# Patient Record
Sex: MALE | Race: WHITE | NOT HISPANIC OR LATINO | URBAN - METROPOLITAN AREA
[De-identification: names, ages, dates, MRNs, and addresses within clinical notes are randomized per-mention and may not be internally consistent; named-entity substitution may affect disease eponyms.]

---

## 2024-05-06 ENCOUNTER — OFFICE VISIT (OUTPATIENT)
Dept: PRIMARY CARE | Facility: CLINIC | Age: 54
End: 2024-05-06
Payer: COMMERCIAL

## 2024-05-06 VITALS — SYSTOLIC BLOOD PRESSURE: 122 MMHG | WEIGHT: 315 LBS | HEART RATE: 110 BPM | DIASTOLIC BLOOD PRESSURE: 76 MMHG

## 2024-05-06 DIAGNOSIS — R93.2 ABNORMAL LIVER CT: ICD-10-CM

## 2024-05-06 DIAGNOSIS — Z23 NEED FOR SHINGLES VACCINE: ICD-10-CM

## 2024-05-06 DIAGNOSIS — E66.9 TYPE 2 DIABETES MELLITUS WITH OBESITY (MULTI): Primary | ICD-10-CM

## 2024-05-06 DIAGNOSIS — F51.01 PRIMARY INSOMNIA: ICD-10-CM

## 2024-05-06 DIAGNOSIS — I10 ESSENTIAL HYPERTENSION: ICD-10-CM

## 2024-05-06 DIAGNOSIS — E78.2 MIXED HYPERLIPIDEMIA: ICD-10-CM

## 2024-05-06 DIAGNOSIS — Z12.11 COLON CANCER SCREENING: ICD-10-CM

## 2024-05-06 DIAGNOSIS — I25.10 CORONARY ARTERY CALCIFICATION SEEN ON CT SCAN: ICD-10-CM

## 2024-05-06 DIAGNOSIS — E11.69 TYPE 2 DIABETES MELLITUS WITH OBESITY (MULTI): Primary | ICD-10-CM

## 2024-05-06 PROBLEM — G47.33 SLEEP APNEA, OBSTRUCTIVE: Status: ACTIVE | Noted: 2024-05-06

## 2024-05-06 PROBLEM — E55.9 VITAMIN D DEFICIENCY: Status: ACTIVE | Noted: 2021-03-08

## 2024-05-06 PROBLEM — B35.1 ONYCHOMYCOSIS: Status: ACTIVE | Noted: 2023-01-04

## 2024-05-06 PROBLEM — E66.813 OBESITY, CLASS III, BMI 40-49.9 (MORBID OBESITY): Status: ACTIVE | Noted: 2018-04-17

## 2024-05-06 PROBLEM — R00.2 PALPITATIONS: Status: ACTIVE | Noted: 2021-04-07

## 2024-05-06 PROBLEM — E66.01 OBESITY, CLASS III, BMI 40-49.9 (MORBID OBESITY) (MULTI): Status: ACTIVE | Noted: 2018-04-17

## 2024-05-06 PROCEDURE — 90750 HZV VACC RECOMBINANT IM: CPT | Performed by: FAMILY MEDICINE

## 2024-05-06 PROCEDURE — 4010F ACE/ARB THERAPY RXD/TAKEN: CPT | Performed by: FAMILY MEDICINE

## 2024-05-06 PROCEDURE — 90471 IMMUNIZATION ADMIN: CPT | Performed by: FAMILY MEDICINE

## 2024-05-06 PROCEDURE — 99204 OFFICE O/P NEW MOD 45 MIN: CPT | Performed by: FAMILY MEDICINE

## 2024-05-06 PROCEDURE — 1036F TOBACCO NON-USER: CPT | Performed by: FAMILY MEDICINE

## 2024-05-06 PROCEDURE — 3078F DIAST BP <80 MM HG: CPT | Performed by: FAMILY MEDICINE

## 2024-05-06 PROCEDURE — 3074F SYST BP LT 130 MM HG: CPT | Performed by: FAMILY MEDICINE

## 2024-05-06 RX ORDER — METFORMIN HYDROCHLORIDE 500 MG/1
1000 TABLET, EXTENDED RELEASE ORAL 2 TIMES DAILY
COMMUNITY
End: 2024-05-09 | Stop reason: SDUPTHER

## 2024-05-06 RX ORDER — ATORVASTATIN CALCIUM 20 MG/1
20 TABLET, FILM COATED ORAL NIGHTLY
COMMUNITY
End: 2024-05-09 | Stop reason: SDUPTHER

## 2024-05-06 RX ORDER — TRAZODONE HYDROCHLORIDE 100 MG/1
100 TABLET ORAL NIGHTLY
COMMUNITY
End: 2024-05-06 | Stop reason: SDUPTHER

## 2024-05-06 RX ORDER — TRAZODONE HYDROCHLORIDE 100 MG/1
100 TABLET ORAL NIGHTLY
Qty: 90 TABLET | Refills: 1 | Status: SHIPPED | OUTPATIENT
Start: 2024-05-06 | End: 2024-11-02

## 2024-05-06 RX ORDER — AMLODIPINE BESYLATE 10 MG/1
10 TABLET ORAL DAILY
COMMUNITY
End: 2024-05-06 | Stop reason: SDUPTHER

## 2024-05-06 RX ORDER — LISINOPRIL 20 MG/1
20 TABLET ORAL DAILY
COMMUNITY
End: 2024-05-06 | Stop reason: SDUPTHER

## 2024-05-06 RX ORDER — ACETAMINOPHEN 500 MG
2000 TABLET ORAL DAILY
COMMUNITY

## 2024-05-06 RX ORDER — DOCUSATE SODIUM 100 MG/1
100 CAPSULE, LIQUID FILLED ORAL NIGHTLY
COMMUNITY

## 2024-05-06 RX ORDER — LISINOPRIL 20 MG/1
20 TABLET ORAL DAILY
Qty: 90 TABLET | Refills: 1 | Status: SHIPPED | OUTPATIENT
Start: 2024-05-06 | End: 2024-11-02

## 2024-05-06 RX ORDER — AMLODIPINE BESYLATE 10 MG/1
10 TABLET ORAL DAILY
Qty: 90 TABLET | Refills: 1 | Status: SHIPPED | OUTPATIENT
Start: 2024-05-06 | End: 2024-11-02

## 2024-05-06 RX ORDER — GLIMEPIRIDE 4 MG/1
4 TABLET ORAL
COMMUNITY
End: 2024-05-09 | Stop reason: SDUPTHER

## 2024-05-06 ASSESSMENT — PATIENT HEALTH QUESTIONNAIRE - PHQ9
2. FEELING DOWN, DEPRESSED OR HOPELESS: NOT AT ALL
1. LITTLE INTEREST OR PLEASURE IN DOING THINGS: NOT AT ALL
SUM OF ALL RESPONSES TO PHQ9 QUESTIONS 1 AND 2: 0

## 2024-05-06 NOTE — ASSESSMENT & PLAN NOTE
We talked at length about the importance of weight loss which can be helped with diminished or elimination of alcohol use    We talked about the use of GLP-1 medication such as Mounjaro, and if you change your mind and accept a trial, please notify me so that I can prescribe that medication for you

## 2024-05-06 NOTE — ASSESSMENT & PLAN NOTE
We reviewed your CT cardiac score from 2021 which revealed a lobulated hepatic contour which can be indicative of liver disease so we need to make sure that the alcohol that you are taking gets diminished dramatically to the point of elimination and we can continue to monitor normal your LFTs, but we may have to consider imaging going forward as well

## 2024-05-06 NOTE — ASSESSMENT & PLAN NOTE
Continue lifestyle modifications with a goal of weight loss to try to maintain proper hemoglobin A1c, i.e. glucose control    Please obtain A1c at your earliest convenience so I can adjust medication accordingly

## 2024-05-06 NOTE — ASSESSMENT & PLAN NOTE
Please obtain fasting blood work to ensure proper dosing of your Lipitor  We will notify of test results once available

## 2024-05-06 NOTE — PROGRESS NOTES
Subjective   Patient ID: Axel Mohan is a 54 y.o. male who presents for Hyperlipidemia, Hypertension, Diabetes, and Insomnia.    Past Medical, Surgical, and Family History reviewed and updated in chart.    Reviewed all medications by prescribing practitioner or clinical pharmacist (such as prescriptions, OTCs, herbal therapies and supplements) and documented in the medical record.    HPI  Axel is a new patient, here to establish based on the recommendation of Katlyn Thornton NP, with a medical history of hypertension, hyperlipidemia, diabetes, vitamin D deficiency, and insomnia. He is generally normotensive outside of clinical settings. His intake blood pressure was initially elevated but normalized to 122/76 after a few minutes.     Cardiovascular Health:  Axel denies experiencing significant chest pain or shortness of breath, although he occasionally feels twinges in his left chest area. He expresses concern about developing atrial fibrillation, a condition his father had, but currently denies any palpitations.    Liver/lipid health:  In 2021, Axel's previous primary care provider, Katlyn Thornton NP, ordered a CT calcium scan. The results revealed a score of 24.8, fatty liver, and a 2.2 cm low-density lesion centrally located within the liver, likely a cyst. The radiologist noted that the cyst was suboptimally evaluated without IV contrast. There was also a concern about a mildly lobulated hepatic contour, which could be indicative of cirrhosis.    Diabetes and Health Management:  Axel admits to struggling with weight loss due to his fondness for beer. Despite previous advice to reduce his alcohol intake, he has not done so. He and Katlyn discussed the use of Mounjaro in the past, but he opted for lifestyle changes instead.    Digestive Health:  Axel has not undergone a colonoscopy since he was 34. Katlyn sent a Cologuard home test, but Axel has not returned it. He indicates a reluctant interest in  pursuing a colonoscopy instead.    Medication Management:  Axel is not fasting today but needs refills for his medications. He requests that they be sent to the local [facility]. He intends to have fasting blood work done in the near future.    Review of Systems  All pertinent positive symptoms are included in the history of present illness.    All other systems have been reviewed and are negative and noncontributory to this patient's current ailments.    History reviewed. No pertinent past medical history.  History reviewed. No pertinent surgical history.  Social History     Tobacco Use    Smoking status: Never    Smokeless tobacco: Never     Family History   Problem Relation Name Age of Onset    Atrial fibrillation Father       Immunization History   Administered Date(s) Administered    Pneumococcal conjugate vaccine, 20-valent (PREVNAR 20) 06/29/2022    Pneumococcal polysaccharide vaccine, 23-valent, age 2 years and older (PNEUMOVAX 23) 01/08/2021    Td vaccine, age 7 years and older (TENIVAC) 04/20/2017    Zoster vaccine, recombinant, adult (SHINGRIX) 05/06/2024     Current Outpatient Medications   Medication Instructions    amLODIPine (NORVASC) 10 mg, oral, Daily    atorvastatin (LIPITOR) 20 mg, oral, Nightly    cholecalciferol (VITAMIN D-3) 2,000 Units, oral, Daily    docusate sodium (COLACE) 100 mg, oral, Nightly    glimepiride (AMARYL) 4 mg, oral, Daily with breakfast    lisinopril 20 mg, oral, Daily    metFORMIN XR (GLUCOPHAGE-XR) 1,000 mg, oral, 2 times daily    traZODone (DESYREL) 100 mg, oral, Nightly     No Known Allergies    Objective   Vitals:    05/06/24 1310 05/06/24 1353   BP: 138/80 122/76   Pulse: 110    Weight: (!) 161 kg (356 lb)      There is no height or weight on file to calculate BMI.    BP Readings from Last 3 Encounters:   05/06/24 122/76      Wt Readings from Last 3 Encounters:   05/06/24 (!) 161 kg (356 lb)     Physical Exam  CONSTITUTIONAL - well nourished, well developed, morbidly obese  white male, looks like stated age, in no acute distress, not ill-appearing, and not tired appearing  SKIN - normal skin color and pigmentation, normal skin turgor without rash, lesions, or nodules visualized  HEAD - no trauma, normocephalic  EYES - pupils are equal and reactive to light, extraocular muscles are intact, and normal external exam  CHEST - clear to auscultation, no wheezing, no crackles and no rales, good effort  CARDIAC - regular rate and regular rhythm, no skipped beats, no murmur  EXTREMITIES - no obvious or evident edema, no obvious or evident deformities  NEUROLOGICAL - normal gait, normal balance, normal motor, no ataxia, alert, oriented and no focal signs  PSYCHIATRIC - alert, pleasant and cordial, age-appropriate    Assessment/Plan   Problem List Items Addressed This Visit       Essential hypertension     Stable on repeat, no changes to medication recommended         Relevant Medications    amLODIPine (Norvasc) 10 mg tablet    lisinopril 20 mg tablet    Other Relevant Orders    Comprehensive Metabolic Panel    Mixed hyperlipidemia     Please obtain fasting blood work to ensure proper dosing of your Lipitor  We will notify of test results once available         Relevant Orders    Lipid Panel    Type 2 diabetes mellitus with obesity (Multi) - Primary     Continue lifestyle modifications with a goal of weight loss to try to maintain proper hemoglobin A1c, i.e. glucose control    Please obtain A1c at your earliest convenience so I can adjust medication accordingly         Relevant Orders    Hemoglobin A1C    Abnormal liver CT     We reviewed your CT cardiac score from 2021 which revealed a lobulated hepatic contour which can be indicative of liver disease so we need to make sure that the alcohol that you are taking gets diminished dramatically to the point of elimination and we can continue to monitor normal your LFTs, but we may have to consider imaging going forward as well         Coronary artery  calcification seen on CT scan    Relevant Medications    amLODIPine (Norvasc) 10 mg tablet    Other Relevant Orders    Lipid Panel     Other Visit Diagnoses       Colon cancer screening        Overdue for colon cancer screening  Colonoscopy requisition sent, please set up with GI ASAP    Relevant Orders    Colonoscopy Screening; Average Risk Patient    Need for shingles vaccine        RTC 6 months for physical exam at which time we will provide Shingrix No. 2    Relevant Orders    Zoster vaccine, recombinant, adult (SHINGRIX) (Completed)    Primary insomnia        Relevant Medications    traZODone (Desyrel) 100 mg tablet

## 2024-05-07 DIAGNOSIS — Z12.11 COLON CANCER SCREENING: ICD-10-CM

## 2024-05-08 ENCOUNTER — APPOINTMENT (OUTPATIENT)
Dept: PRIMARY CARE | Facility: CLINIC | Age: 54
End: 2024-05-08
Payer: COMMERCIAL

## 2024-05-08 ENCOUNTER — LAB (OUTPATIENT)
Dept: LAB | Facility: LAB | Age: 54
End: 2024-05-08
Payer: COMMERCIAL

## 2024-05-08 DIAGNOSIS — E78.2 MIXED HYPERLIPIDEMIA: ICD-10-CM

## 2024-05-08 DIAGNOSIS — E11.69 TYPE 2 DIABETES MELLITUS WITH OBESITY (MULTI): ICD-10-CM

## 2024-05-08 DIAGNOSIS — I10 ESSENTIAL HYPERTENSION: ICD-10-CM

## 2024-05-08 DIAGNOSIS — I25.10 CORONARY ARTERY CALCIFICATION SEEN ON CT SCAN: ICD-10-CM

## 2024-05-08 DIAGNOSIS — E66.9 TYPE 2 DIABETES MELLITUS WITH OBESITY (MULTI): ICD-10-CM

## 2024-05-08 LAB
ALBUMIN SERPL BCP-MCNC: 4.5 G/DL (ref 3.4–5)
ALP SERPL-CCNC: 56 U/L (ref 33–120)
ALT SERPL W P-5'-P-CCNC: 37 U/L (ref 10–52)
ANION GAP SERPL CALC-SCNC: 12 MMOL/L (ref 10–20)
AST SERPL W P-5'-P-CCNC: 24 U/L (ref 9–39)
BILIRUB SERPL-MCNC: 0.4 MG/DL (ref 0–1.2)
BUN SERPL-MCNC: 15 MG/DL (ref 6–23)
CALCIUM SERPL-MCNC: 9.7 MG/DL (ref 8.6–10.3)
CHLORIDE SERPL-SCNC: 100 MMOL/L (ref 98–107)
CHOLEST SERPL-MCNC: 138 MG/DL (ref 0–199)
CHOLESTEROL/HDL RATIO: 1.9
CO2 SERPL-SCNC: 24 MMOL/L (ref 21–32)
CREAT SERPL-MCNC: 0.85 MG/DL (ref 0.5–1.3)
EGFRCR SERPLBLD CKD-EPI 2021: >90 ML/MIN/1.73M*2
EST. AVERAGE GLUCOSE BLD GHB EST-MCNC: 154 MG/DL
GLUCOSE SERPL-MCNC: 153 MG/DL (ref 74–99)
HBA1C MFR BLD: 7 %
HDLC SERPL-MCNC: 72.2 MG/DL
LDLC SERPL CALC-MCNC: 52 MG/DL
NON HDL CHOLESTEROL: 66 MG/DL (ref 0–149)
POTASSIUM SERPL-SCNC: 4.4 MMOL/L (ref 3.5–5.3)
PROT SERPL-MCNC: 7.5 G/DL (ref 6.4–8.2)
SODIUM SERPL-SCNC: 132 MMOL/L (ref 136–145)
TRIGL SERPL-MCNC: 71 MG/DL (ref 0–149)
VLDL: 14 MG/DL (ref 0–40)

## 2024-05-08 PROCEDURE — 80053 COMPREHEN METABOLIC PANEL: CPT

## 2024-05-08 PROCEDURE — 83036 HEMOGLOBIN GLYCOSYLATED A1C: CPT

## 2024-05-08 PROCEDURE — 36415 COLL VENOUS BLD VENIPUNCTURE: CPT

## 2024-05-08 PROCEDURE — 80061 LIPID PANEL: CPT

## 2024-05-08 RX ORDER — POLYETHYLENE GLYCOL 3350, SODIUM CHLORIDE, SODIUM BICARBONATE, POTASSIUM CHLORIDE 420; 11.2; 5.72; 1.48 G/4L; G/4L; G/4L; G/4L
POWDER, FOR SOLUTION ORAL
Qty: 4000 ML | Refills: 0 | Status: SHIPPED | OUTPATIENT
Start: 2024-05-08

## 2024-05-09 ENCOUNTER — OFFICE VISIT (OUTPATIENT)
Dept: PRIMARY CARE | Facility: CLINIC | Age: 54
End: 2024-05-09
Payer: COMMERCIAL

## 2024-05-09 VITALS
HEART RATE: 66 BPM | DIASTOLIC BLOOD PRESSURE: 90 MMHG | SYSTOLIC BLOOD PRESSURE: 138 MMHG | WEIGHT: 315 LBS | BODY MASS INDEX: 42.66 KG/M2 | HEIGHT: 72 IN

## 2024-05-09 DIAGNOSIS — R30.0 DYSURIA: ICD-10-CM

## 2024-05-09 DIAGNOSIS — E66.9 TYPE 2 DIABETES MELLITUS WITH OBESITY (MULTI): ICD-10-CM

## 2024-05-09 DIAGNOSIS — E11.69 TYPE 2 DIABETES MELLITUS WITH OBESITY (MULTI): ICD-10-CM

## 2024-05-09 DIAGNOSIS — B35.6 TINEA CRURIS: Primary | ICD-10-CM

## 2024-05-09 DIAGNOSIS — E87.1 HYPONATREMIA: Primary | ICD-10-CM

## 2024-05-09 DIAGNOSIS — E87.1 HYPONATREMIA: ICD-10-CM

## 2024-05-09 DIAGNOSIS — E78.2 MIXED HYPERLIPIDEMIA: Primary | ICD-10-CM

## 2024-05-09 LAB
POC APPEARANCE, URINE: CLEAR
POC BILIRUBIN, URINE: NEGATIVE
POC BLOOD, URINE: NEGATIVE
POC COLOR, URINE: YELLOW
POC GLUCOSE, URINE: NEGATIVE MG/DL
POC KETONES, URINE: NEGATIVE MG/DL
POC LEUKOCYTES, URINE: NEGATIVE
POC NITRITE,URINE: NEGATIVE
POC PH, URINE: 6.5 PH
POC PROTEIN, URINE: NEGATIVE MG/DL
POC SPECIFIC GRAVITY, URINE: 1.01
POC UROBILINOGEN, URINE: 0.2 EU/DL

## 2024-05-09 PROCEDURE — 1036F TOBACCO NON-USER: CPT | Performed by: FAMILY MEDICINE

## 2024-05-09 PROCEDURE — 3051F HG A1C>EQUAL 7.0%<8.0%: CPT | Performed by: FAMILY MEDICINE

## 2024-05-09 PROCEDURE — 99214 OFFICE O/P EST MOD 30 MIN: CPT | Performed by: FAMILY MEDICINE

## 2024-05-09 PROCEDURE — 3048F LDL-C <100 MG/DL: CPT | Performed by: FAMILY MEDICINE

## 2024-05-09 PROCEDURE — 4010F ACE/ARB THERAPY RXD/TAKEN: CPT | Performed by: FAMILY MEDICINE

## 2024-05-09 PROCEDURE — 3080F DIAST BP >= 90 MM HG: CPT | Performed by: FAMILY MEDICINE

## 2024-05-09 PROCEDURE — 3075F SYST BP GE 130 - 139MM HG: CPT | Performed by: FAMILY MEDICINE

## 2024-05-09 PROCEDURE — 81002 URINALYSIS NONAUTO W/O SCOPE: CPT | Performed by: FAMILY MEDICINE

## 2024-05-09 RX ORDER — CLOTRIMAZOLE 1 %
CREAM (GRAM) TOPICAL 2 TIMES DAILY
Qty: 45 G | Refills: 2 | Status: SHIPPED | OUTPATIENT
Start: 2024-05-09

## 2024-05-09 RX ORDER — GLIMEPIRIDE 4 MG/1
4 TABLET ORAL
Qty: 90 TABLET | Refills: 1 | Status: SHIPPED | OUTPATIENT
Start: 2024-05-09 | End: 2024-11-05

## 2024-05-09 RX ORDER — METFORMIN HYDROCHLORIDE 500 MG/1
1000 TABLET, EXTENDED RELEASE ORAL 2 TIMES DAILY
Qty: 360 TABLET | Refills: 1 | Status: SHIPPED | OUTPATIENT
Start: 2024-05-09 | End: 2024-11-05

## 2024-05-09 RX ORDER — METFORMIN HYDROCHLORIDE 750 MG/1
750 TABLET, EXTENDED RELEASE ORAL
Qty: 180 TABLET | Refills: 1 | Status: SHIPPED | OUTPATIENT
Start: 2024-05-09 | End: 2024-05-09

## 2024-05-09 RX ORDER — ATORVASTATIN CALCIUM 20 MG/1
20 TABLET, FILM COATED ORAL NIGHTLY
Qty: 90 TABLET | Refills: 1 | Status: SHIPPED | OUTPATIENT
Start: 2024-05-09 | End: 2024-11-05

## 2024-05-09 ASSESSMENT — PATIENT HEALTH QUESTIONNAIRE - PHQ9
SUM OF ALL RESPONSES TO PHQ9 QUESTIONS 1 AND 2: 0
2. FEELING DOWN, DEPRESSED OR HOPELESS: NOT AT ALL
1. LITTLE INTEREST OR PLEASURE IN DOING THINGS: NOT AT ALL

## 2024-05-09 NOTE — ASSESSMENT & PLAN NOTE
I suspect this occurred because of the excessive alcohol that you had the night before the blood work followed by nearly a gallon of water to try to flush your system.  I would recommend fluid restriction to about 1 L or less per day for the next 7 days and then repeat the sodium thereafter.  If we cannot figure this out, then we need to get nephrology involved

## 2024-05-09 NOTE — ASSESSMENT & PLAN NOTE
It looks as though you have a yeast infection of the groin creases and therefore I suggested a topical antifungal, but oftentimes this is not enough to treat the area of concern.  You need to have the area absolutely dry before applying this cream, so I would recommend taking a shower, using a blow dryer to dry the area, and then apply the topical antifungal cream.  If it continues to persist, we may have to use oral antifungal medication

## 2024-05-09 NOTE — ASSESSMENT & PLAN NOTE
"I suggested adding another medication to the regimen, but at this juncture you are not interested in pursuing another medicine so instead we made a shared decision to have you try lifestyle modification including cutting back on alcohol, exercising, and trying to get out of the modality of \"work hard, play hard\".     It starts now!  You have to do this for yourself because diabetes is destroying you on a cellular level every single moment of every single day.  "

## 2024-05-09 NOTE — PROGRESS NOTES
Subjective   Patient ID: Axel Mohan is a 54 y.o. male who presents for Rash, Diabetes, and Abnormal Sodium.    Past Medical, Surgical, and Family History reviewed and updated in chart.    Reviewed all medications by prescribing practitioner or clinical pharmacist (such as prescriptions, OTCs, herbal therapies and supplements) and documented in the medical record.    HPI  1. Diabetes:   Axel's recent Hemoglobin A1c results indicate a slight increase to 7.0%, with previous results at 6.9%, 6.9%, and 6.7%. As discussed during our office visit, Axel has increased his alcohol consumption, including several margaritas the night before his recent blood work. He is not interested in exploring new medication options at this moment and wishes to continue with his current metformin dosage of 2000 mg daily. He hopes that lifestyle changes will help lower his A1c levels.    2. Hyponatremia:   Axel's recent blood work showed a drop in his sodium levels to 132, despite it never being low in the past. He had several margaritas the night before his blood work, followed by water intake in an attempt to cleanse his body and potentially improve his blood work results.    3. Groin Rash:   Axel occasionally experiences a rash in his groin area, which presents as itchy and red. The rash seems to disappear when he applies a topical cream that he suspects may be an antifungal, such as nystatin, but he cannot recall the exact name.    4. Penile Burning:   Axel reports intermittent sensations of burning inside his penis. This sensation appears to subside when he urinates. He does not report any increase in urinary frequency or urgency, and there is no dysuria. He finds the sensation challenging to describe but notes that it feels like a constant burning.    Review of Systems  All pertinent positive symptoms are included in the history of present illness.    All other systems have been reviewed and are negative and noncontributory to this  patient's current ailments.    History reviewed. No pertinent past medical history.  History reviewed. No pertinent surgical history.  Social History     Tobacco Use    Smoking status: Never    Smokeless tobacco: Never     Family History   Problem Relation Name Age of Onset    Atrial fibrillation Father       Immunization History   Administered Date(s) Administered    Pneumococcal conjugate vaccine, 20-valent (PREVNAR 20) 06/29/2022    Pneumococcal polysaccharide vaccine, 23-valent, age 2 years and older (PNEUMOVAX 23) 01/08/2021    Td vaccine, age 7 years and older (TENIVAC) 04/20/2017    Zoster vaccine, recombinant, adult (SHINGRIX) 05/06/2024     Current Outpatient Medications   Medication Instructions    amLODIPine (NORVASC) 10 mg, oral, Daily    atorvastatin (LIPITOR) 20 mg, oral, Nightly    cholecalciferol (VITAMIN D-3) 2,000 Units, oral, Daily    clotrimazole (Lotrimin) 1 % cream Topical, 2 times daily, apply to affected area    docusate sodium (COLACE) 100 mg, oral, Nightly    glimepiride (AMARYL) 4 mg, oral, Daily with breakfast    lisinopril 20 mg, oral, Daily    metFORMIN XR (GLUCOPHAGE-XR) 1,000 mg, oral, 2 times daily    polyethylene glycol-electrolytes (Nulytely) 420 gram solution Drink 1/2 starting at 6 pm the night before your procedure then drink the 2nd 1/2 5 hours before procedure arrival tme    traZODone (DESYREL) 100 mg, oral, Nightly     No Known Allergies    Objective   Vitals:    05/09/24 1145   BP: 138/90   Pulse: 66   Weight: (!) 151 kg (332 lb)   Height: 1.829 m (6')     Body mass index is 45.03 kg/m².    BP Readings from Last 3 Encounters:   05/09/24 138/90   05/06/24 122/76      Wt Readings from Last 3 Encounters:   05/09/24 (!) 151 kg (332 lb)   05/06/24 (!) 161 kg (356 lb)        Office Visit on 05/09/2024   Component Date Value    POC Color, Urine 05/09/2024 Yellow     POC Appearance, Urine 05/09/2024 Clear     POC Glucose, Urine 05/09/2024 NEGATIVE     POC Bilirubin, Urine 05/09/2024  NEGATIVE     POC Ketones, Urine 05/09/2024 NEGATIVE     POC Specific Gravity, Ur* 05/09/2024 1.015     POC Blood, Urine 05/09/2024 NEGATIVE     POC PH, Urine 05/09/2024 6.5     POC Protein, Urine 05/09/2024 NEGATIVE     POC Urobilinogen, Urine 05/09/2024 0.2     Poc Nitrite, Urine 05/09/2024 NEGATIVE     POC Leukocytes, Urine 05/09/2024 NEGATIVE    Lab on 05/08/2024   Component Date Value    Glucose 05/08/2024 153 (H)     Sodium 05/08/2024 132 (L)     Potassium 05/08/2024 4.4     Chloride 05/08/2024 100     Bicarbonate 05/08/2024 24     Anion Gap 05/08/2024 12     Urea Nitrogen 05/08/2024 15     Creatinine 05/08/2024 0.85     eGFR 05/08/2024 >90     Calcium 05/08/2024 9.7     Albumin 05/08/2024 4.5     Alkaline Phosphatase 05/08/2024 56     Total Protein 05/08/2024 7.5     AST 05/08/2024 24     Bilirubin, Total 05/08/2024 0.4     ALT 05/08/2024 37     Hemoglobin A1C 05/08/2024 7.0 (H)     Estimated Average Glucose 05/08/2024 154     Cholesterol 05/08/2024 138     HDL-Cholesterol 05/08/2024 72.2     Cholesterol/HDL Ratio 05/08/2024 1.9     LDL Calculated 05/08/2024 52     VLDL 05/08/2024 14     Triglycerides 05/08/2024 71     Non HDL Cholesterol 05/08/2024 66      Physical Exam  CONSTITUTIONAL - well nourished, well developed, looks like stated age, in no acute distress, not ill-appearing, and not tired appearing  SKIN - normal skin color and pigmentation, normal skin turgor without lesions, or nodules visualized; bilateral groin area even the undersurface of the scrotum with well-demarcated erythematous patches of skin, groin crease with folds in the area secondary to obesity  HEAD - no trauma, normocephalic  EYES - pupils are equal and reactive to light, extraocular muscles are intact, and normal external exam  EXTREMITIES - no obvious or evident edema, no obvious or evident deformities  NEUROLOGICAL - normal gait, normal balance, normal motor, no ataxia, alert, oriented and no focal signs  PSYCHIATRIC - alert,  "pleasant and cordial, age-appropriate    Assessment/Plan   Problem List Items Addressed This Visit       Type 2 diabetes mellitus with obesity (Multi)     I suggested adding another medication to the regimen, but at this juncture you are not interested in pursuing another medicine so instead we made a shared decision to have you try lifestyle modification including cutting back on alcohol, exercising, and trying to get out of the modality of \"work hard, play hard\".     It starts now!  You have to do this for yourself because diabetes is destroying you on a cellular level every single moment of every single day.         Relevant Medications    metFORMIN XR (Glucophage-XR) 500 mg 24 hr tablet    Tinea cruris - Primary     It looks as though you have a yeast infection of the groin creases and therefore I suggested a topical antifungal, but oftentimes this is not enough to treat the area of concern.  You need to have the area absolutely dry before applying this cream, so I would recommend taking a shower, using a blow dryer to dry the area, and then apply the topical antifungal cream.  If it continues to persist, we may have to use oral antifungal medication         Relevant Medications    clotrimazole (Lotrimin) 1 % cream    Dysuria     Uncertain as to the etiology of this concern, but it could be secondary to excessive glucose in the urine and when you urinate, it is leaving yeast along the path of the urethra so we are going to check a urinalysis along with a culture to make sure there is no underlying infection         Relevant Orders    POCT UA (nonautomated) manually resulted (Completed)    Urine Culture    Hyponatremia     I suspect this occurred because of the excessive alcohol that you had the night before the blood work followed by nearly a gallon of water to try to flush your system.  I would recommend fluid restriction to about 1 L or less per day for the next 7 days and then repeat the sodium thereafter.  " If we cannot figure this out, then we need to get nephrology involved

## 2024-05-09 NOTE — ASSESSMENT & PLAN NOTE
Uncertain as to the etiology of this concern, but it could be secondary to excessive glucose in the urine and when you urinate, it is leaving yeast along the path of the urethra so we are going to check a urinalysis along with a culture to make sure there is no underlying infection

## 2024-05-20 ENCOUNTER — APPOINTMENT (OUTPATIENT)
Dept: PRIMARY CARE | Facility: CLINIC | Age: 54
End: 2024-05-20
Payer: COMMERCIAL

## 2024-06-25 ENCOUNTER — APPOINTMENT (OUTPATIENT)
Dept: GASTROENTEROLOGY | Facility: EXTERNAL LOCATION | Age: 54
End: 2024-06-25
Payer: COMMERCIAL

## 2024-06-25 DIAGNOSIS — Z80.0 FAMILY HISTORY OF MALIGNANT NEOPLASM OF DIGESTIVE ORGANS: ICD-10-CM

## 2024-06-25 DIAGNOSIS — D12.2 BENIGN NEOPLASM OF ASCENDING COLON: ICD-10-CM

## 2024-06-25 DIAGNOSIS — Q43.8 REDUNDANT COLON: Primary | ICD-10-CM

## 2024-06-25 DIAGNOSIS — Z12.11 COLON CANCER SCREENING: ICD-10-CM

## 2024-06-25 DIAGNOSIS — K64.8 OTHER HEMORRHOIDS: ICD-10-CM

## 2024-06-25 DIAGNOSIS — K57.30 DIVERTICULOSIS OF LARGE INTESTINE WITHOUT DIVERTICULITIS: ICD-10-CM

## 2024-06-25 PROCEDURE — 3051F HG A1C>EQUAL 7.0%<8.0%: CPT | Performed by: INTERNAL MEDICINE

## 2024-06-25 PROCEDURE — 4010F ACE/ARB THERAPY RXD/TAKEN: CPT | Performed by: INTERNAL MEDICINE

## 2024-06-25 PROCEDURE — 3048F LDL-C <100 MG/DL: CPT | Performed by: INTERNAL MEDICINE

## 2024-06-25 PROCEDURE — 45385 COLONOSCOPY W/LESION REMOVAL: CPT | Performed by: INTERNAL MEDICINE

## 2024-06-26 ENCOUNTER — LAB REQUISITION (OUTPATIENT)
Dept: LAB | Facility: HOSPITAL | Age: 54
End: 2024-06-26
Payer: COMMERCIAL

## 2024-06-27 ENCOUNTER — APPOINTMENT (OUTPATIENT)
Dept: GASTROENTEROLOGY | Facility: EXTERNAL LOCATION | Age: 54
End: 2024-06-27
Payer: COMMERCIAL

## 2024-07-10 LAB
LABORATORY COMMENT REPORT: NORMAL
PATH REPORT.FINAL DX SPEC: NORMAL
PATH REPORT.GROSS SPEC: NORMAL
PATH REPORT.RELEVANT HX SPEC: NORMAL
PATH REPORT.TOTAL CANCER: NORMAL

## 2024-07-10 PROCEDURE — RXMED WILLOW AMBULATORY MEDICATION CHARGE

## 2024-07-16 ENCOUNTER — PHARMACY VISIT (OUTPATIENT)
Dept: PHARMACY | Facility: CLINIC | Age: 54
End: 2024-07-16
Payer: COMMERCIAL

## 2024-08-08 ENCOUNTER — HOSPITAL ENCOUNTER (OUTPATIENT)
Dept: RADIOLOGY | Facility: HOSPITAL | Age: 54
Discharge: HOME | End: 2024-08-08
Payer: COMMERCIAL

## 2024-08-08 DIAGNOSIS — Q43.8 REDUNDANT COLON: ICD-10-CM

## 2024-08-08 DIAGNOSIS — Z12.11 COLON CANCER SCREENING: ICD-10-CM

## 2024-08-08 PROCEDURE — 74261 CT COLONOGRAPHY DX: CPT

## 2024-08-08 PROCEDURE — 2720000007 HC OR 272 NO HCPCS

## 2024-10-24 ENCOUNTER — APPOINTMENT (OUTPATIENT)
Dept: PRIMARY CARE | Facility: CLINIC | Age: 54
End: 2024-10-24
Payer: COMMERCIAL

## 2024-10-24 ENCOUNTER — LAB (OUTPATIENT)
Dept: LAB | Facility: LAB | Age: 54
End: 2024-10-24
Payer: COMMERCIAL

## 2024-10-24 VITALS
SYSTOLIC BLOOD PRESSURE: 136 MMHG | BODY MASS INDEX: 42.66 KG/M2 | WEIGHT: 315 LBS | DIASTOLIC BLOOD PRESSURE: 80 MMHG | HEART RATE: 89 BPM | HEIGHT: 72 IN

## 2024-10-24 DIAGNOSIS — Z00.00 ANNUAL PHYSICAL EXAM: Primary | ICD-10-CM

## 2024-10-24 DIAGNOSIS — Z00.00 ANNUAL PHYSICAL EXAM: ICD-10-CM

## 2024-10-24 DIAGNOSIS — E78.2 MIXED HYPERLIPIDEMIA: ICD-10-CM

## 2024-10-24 DIAGNOSIS — E55.9 VITAMIN D DEFICIENCY: ICD-10-CM

## 2024-10-24 DIAGNOSIS — Z11.59 NEED FOR HEPATITIS C SCREENING TEST: ICD-10-CM

## 2024-10-24 DIAGNOSIS — E66.9 TYPE 2 DIABETES MELLITUS WITH OBESITY (MULTI): ICD-10-CM

## 2024-10-24 DIAGNOSIS — Z23 NEED FOR SHINGLES VACCINE: ICD-10-CM

## 2024-10-24 DIAGNOSIS — Z11.4 ENCOUNTER FOR SCREENING FOR HIV: ICD-10-CM

## 2024-10-24 DIAGNOSIS — Z12.5 PROSTATE CANCER SCREENING: ICD-10-CM

## 2024-10-24 DIAGNOSIS — E11.69 TYPE 2 DIABETES MELLITUS WITH OBESITY (MULTI): ICD-10-CM

## 2024-10-24 DIAGNOSIS — I10 ESSENTIAL HYPERTENSION: ICD-10-CM

## 2024-10-24 DIAGNOSIS — F51.01 PRIMARY INSOMNIA: ICD-10-CM

## 2024-10-24 DIAGNOSIS — K59.09 OTHER CONSTIPATION: ICD-10-CM

## 2024-10-24 LAB
ALBUMIN SERPL BCP-MCNC: 4.4 G/DL (ref 3.4–5)
ALP SERPL-CCNC: 56 U/L (ref 33–120)
ALT SERPL W P-5'-P-CCNC: 43 U/L (ref 10–52)
ANION GAP SERPL CALC-SCNC: 11 MMOL/L (ref 10–20)
AST SERPL W P-5'-P-CCNC: 30 U/L (ref 9–39)
BASOPHILS # BLD AUTO: 0.03 X10*3/UL (ref 0–0.1)
BASOPHILS NFR BLD AUTO: 0.4 %
BILIRUB SERPL-MCNC: 0.5 MG/DL (ref 0–1.2)
BUN SERPL-MCNC: 13 MG/DL (ref 6–23)
CALCIUM SERPL-MCNC: 9.3 MG/DL (ref 8.6–10.3)
CHLORIDE SERPL-SCNC: 101 MMOL/L (ref 98–107)
CHOLEST SERPL-MCNC: 142 MG/DL (ref 0–199)
CHOLESTEROL/HDL RATIO: 1.9
CO2 SERPL-SCNC: 26 MMOL/L (ref 21–32)
CREAT SERPL-MCNC: 0.86 MG/DL (ref 0.5–1.3)
EGFRCR SERPLBLD CKD-EPI 2021: >90 ML/MIN/1.73M*2
EOSINOPHIL # BLD AUTO: 0.03 X10*3/UL (ref 0–0.7)
EOSINOPHIL NFR BLD AUTO: 0.4 %
ERYTHROCYTE [DISTWIDTH] IN BLOOD BY AUTOMATED COUNT: 13 % (ref 11.5–14.5)
GLUCOSE SERPL-MCNC: 186 MG/DL (ref 74–99)
HCT VFR BLD AUTO: 43.2 % (ref 41–52)
HCV AB SER QL: NONREACTIVE
HDLC SERPL-MCNC: 72.9 MG/DL
HGB BLD-MCNC: 14.5 G/DL (ref 13.5–17.5)
HIV 1+2 AB+HIV1 P24 AG SERPL QL IA: NONREACTIVE
IMM GRANULOCYTES # BLD AUTO: 0.01 X10*3/UL (ref 0–0.7)
IMM GRANULOCYTES NFR BLD AUTO: 0.1 % (ref 0–0.9)
LDLC SERPL CALC-MCNC: 52 MG/DL
LYMPHOCYTES # BLD AUTO: 1.83 X10*3/UL (ref 1.2–4.8)
LYMPHOCYTES NFR BLD AUTO: 27.3 %
MCH RBC QN AUTO: 31.7 PG (ref 26–34)
MCHC RBC AUTO-ENTMCNC: 33.6 G/DL (ref 32–36)
MCV RBC AUTO: 94 FL (ref 80–100)
MONOCYTES # BLD AUTO: 0.46 X10*3/UL (ref 0.1–1)
MONOCYTES NFR BLD AUTO: 6.9 %
NEUTROPHILS # BLD AUTO: 4.35 X10*3/UL (ref 1.2–7.7)
NEUTROPHILS NFR BLD AUTO: 64.9 %
NON HDL CHOLESTEROL: 69 MG/DL (ref 0–149)
NRBC BLD-RTO: 0 /100 WBCS (ref 0–0)
PLATELET # BLD AUTO: 273 X10*3/UL (ref 150–450)
POC APPEARANCE, URINE: CLEAR
POC BILIRUBIN, URINE: NEGATIVE
POC BLOOD, URINE: NEGATIVE
POC COLOR, URINE: YELLOW
POC GLUCOSE, URINE: NEGATIVE MG/DL
POC KETONES, URINE: NEGATIVE MG/DL
POC LEUKOCYTES, URINE: NEGATIVE
POC NITRITE,URINE: NEGATIVE
POC PH, URINE: 6 PH
POC PROTEIN, URINE: NEGATIVE MG/DL
POC SPECIFIC GRAVITY, URINE: 1.02
POC UROBILINOGEN, URINE: 0.2 EU/DL
POTASSIUM SERPL-SCNC: 4.3 MMOL/L (ref 3.5–5.3)
PROT SERPL-MCNC: 7.4 G/DL (ref 6.4–8.2)
PSA SERPL-MCNC: 1.23 NG/ML
RBC # BLD AUTO: 4.58 X10*6/UL (ref 4.5–5.9)
SODIUM SERPL-SCNC: 134 MMOL/L (ref 136–145)
T4 FREE SERPL-MCNC: 0.81 NG/DL (ref 0.61–1.12)
TRIGL SERPL-MCNC: 86 MG/DL (ref 0–149)
TSH SERPL-ACNC: 4.79 MIU/L (ref 0.44–3.98)
VLDL: 17 MG/DL (ref 0–40)
WBC # BLD AUTO: 6.7 X10*3/UL (ref 4.4–11.3)

## 2024-10-24 PROCEDURE — 85025 COMPLETE CBC W/AUTO DIFF WBC: CPT

## 2024-10-24 PROCEDURE — 84443 ASSAY THYROID STIM HORMONE: CPT

## 2024-10-24 PROCEDURE — 84439 ASSAY OF FREE THYROXINE: CPT

## 2024-10-24 PROCEDURE — 80053 COMPREHEN METABOLIC PANEL: CPT

## 2024-10-24 PROCEDURE — 83036 HEMOGLOBIN GLYCOSYLATED A1C: CPT

## 2024-10-24 PROCEDURE — 36415 COLL VENOUS BLD VENIPUNCTURE: CPT

## 2024-10-24 PROCEDURE — 86803 HEPATITIS C AB TEST: CPT

## 2024-10-24 PROCEDURE — 80061 LIPID PANEL: CPT

## 2024-10-24 PROCEDURE — 87389 HIV-1 AG W/HIV-1&-2 AB AG IA: CPT

## 2024-10-24 PROCEDURE — 84153 ASSAY OF PSA TOTAL: CPT

## 2024-10-24 RX ORDER — ACETAMINOPHEN 500 MG
2000 TABLET ORAL DAILY
Qty: 90 CAPSULE | Refills: 1 | Status: SHIPPED | OUTPATIENT
Start: 2024-10-24

## 2024-10-24 RX ORDER — ATORVASTATIN CALCIUM 20 MG/1
20 TABLET, FILM COATED ORAL NIGHTLY
Qty: 90 TABLET | Refills: 1 | Status: SHIPPED | OUTPATIENT
Start: 2024-10-24 | End: 2025-04-22

## 2024-10-24 RX ORDER — TRAZODONE HYDROCHLORIDE 100 MG/1
100 TABLET ORAL NIGHTLY
Qty: 90 TABLET | Refills: 1 | Status: SHIPPED | OUTPATIENT
Start: 2024-10-24 | End: 2025-04-22

## 2024-10-24 RX ORDER — METFORMIN HYDROCHLORIDE 500 MG/1
1000 TABLET, EXTENDED RELEASE ORAL 2 TIMES DAILY
Qty: 360 TABLET | Refills: 1 | Status: SHIPPED | OUTPATIENT
Start: 2024-10-24 | End: 2025-04-22

## 2024-10-24 RX ORDER — AMLODIPINE BESYLATE 10 MG/1
10 TABLET ORAL DAILY
Qty: 90 TABLET | Refills: 1 | Status: SHIPPED | OUTPATIENT
Start: 2024-10-24 | End: 2025-04-22

## 2024-10-24 RX ORDER — LISINOPRIL 20 MG/1
20 TABLET ORAL DAILY
Qty: 90 TABLET | Refills: 1 | Status: SHIPPED | OUTPATIENT
Start: 2024-10-24 | End: 2025-04-22

## 2024-10-24 RX ORDER — DOCUSATE SODIUM 100 MG/1
100 CAPSULE, LIQUID FILLED ORAL NIGHTLY
Qty: 90 CAPSULE | Refills: 1 | Status: SHIPPED | OUTPATIENT
Start: 2024-10-24

## 2024-10-24 RX ORDER — GLIMEPIRIDE 4 MG/1
4 TABLET ORAL
Qty: 90 TABLET | Refills: 1 | Status: SHIPPED | OUTPATIENT
Start: 2024-10-24 | End: 2025-04-22

## 2024-10-24 ASSESSMENT — PROMIS GLOBAL HEALTH SCALE
CARRYOUT_SOCIAL_ACTIVITIES: VERY GOOD
RATE_QUALITY_OF_LIFE: VERY GOOD
RATE_GENERAL_HEALTH: GOOD
RATE_SOCIAL_SATISFACTION: EXCELLENT
CARRYOUT_PHYSICAL_ACTIVITIES: COMPLETELY
RATE_AVERAGE_FATIGUE: MILD
RATE_PHYSICAL_HEALTH: FAIR
EMOTIONAL_PROBLEMS: RARELY
RATE_AVERAGE_PAIN: 5
RATE_MENTAL_HEALTH: VERY GOOD

## 2024-10-24 ASSESSMENT — PATIENT HEALTH QUESTIONNAIRE - PHQ9
1. LITTLE INTEREST OR PLEASURE IN DOING THINGS: NOT AT ALL
SUM OF ALL RESPONSES TO PHQ9 QUESTIONS 1 AND 2: 0
2. FEELING DOWN, DEPRESSED OR HOPELESS: NOT AT ALL

## 2024-10-24 NOTE — PROGRESS NOTES
Subjective   Patient ID: Axel Mohan is a 54 y.o. male who presents for Annual Exam, Hypertension, Hyperlipidemia, Insomnia, and Diabetes.    Past Medical, Surgical, and Family History reviewed and updated in chart.    Reviewed all medications by prescribing practitioner or clinical pharmacist (such as prescriptions, OTCs, herbal therapies and supplements) and documented in the medical record.    HPI  1. Physical exam.  Colonoscopy: CT colonography negative w/5 year clearance  Immunizations: Shingrix No. 2 due, declines flu  Feeling overall well today    2. Hyperlipidemia  Last lipid panel 138, 72.2, 52, 71  Takes atorvastatin 20 mg daily  Denies cardiopulmonary symptoms  Requesting refills today    3. Hypertension  /80 on intake, but patient reports that he hasn't taken his medication today  Takes amlodipine 10 mg and lisinopril 20 mg, tolerates well  Requesting refills today    4. T2DM  Last A1c was 7%  Currently taking Glimepiride 4mg and Metformin 1000mg BID and tolerating well  Working on low carb diet and incorporating regular exercise into his routine  Hesitant about adding diabetes medication to his current regimen  Would like to continue to make diet and lifestyle adjustments  Requesting refills today    5. Insomnia  Takes Trazodone 100mg nightly  Wears CPAP and does feel like he needs trazodone nightly  Works well for him    6. Constipation  Takes docusate 100mg nightly which helps his bowel habits  Requesting refills today    Review of Systems  All pertinent positive symptoms are included in the history of present illness.    All other systems have been reviewed and are negative and noncontributory to this patient's current ailments.    Past Medical History:   Diagnosis Date    Essential hypertension 05/06/2024    Hyponatremia 05/09/2024    Mixed hyperlipidemia 06/29/2022    Type 2 diabetes mellitus with obesity (Multi) 05/06/2024       History reviewed. No pertinent surgical history.    Social  History     Tobacco Use    Smoking status: Never    Smokeless tobacco: Never     Family History   Problem Relation Name Age of Onset    Atrial fibrillation Father       Immunization History   Administered Date(s) Administered    Pneumococcal conjugate vaccine, 20-valent (PREVNAR 20) 06/29/2022    Pneumococcal polysaccharide vaccine, 23-valent, age 2 years and older (PNEUMOVAX 23) 01/08/2021    Td vaccine, age 7 years and older (TENIVAC) 04/20/2017    Zoster vaccine, recombinant, adult (SHINGRIX) 05/06/2024, 10/24/2024     Current Outpatient Medications   Medication Instructions    amLODIPine (NORVASC) 10 mg, oral, Daily    atorvastatin (LIPITOR) 20 mg, oral, Nightly    cholecalciferol (VITAMIN D-3) 50 mcg, oral, Daily    clotrimazole (Lotrimin) 1 % cream Topical, 2 times daily, apply to affected area    diatrizoate jacob-diatrizoat sod (Gastrografin) solution Take 15 ml (1 tablespoon) by mouth before bed, discard remaining medication.    docusate sodium (COLACE) 100 mg, oral, Nightly    glimepiride (AMARYL) 4 mg, oral, Daily with breakfast    lisinopril 20 mg, oral, Daily    metFORMIN XR (GLUCOPHAGE-XR) 1,000 mg, oral, 2 times daily    traZODone (DESYREL) 100 mg, oral, Nightly     No Known Allergies    Objective   Vitals:    10/24/24 0936 10/24/24 1000   BP: 150/90 136/80   Pulse: 89    Weight: (!) 172 kg (379 lb)    Height: 1.829 m (6')      Body mass index is 51.4 kg/m².    BP Readings from Last 3 Encounters:   10/24/24 136/80   05/09/24 138/90   05/06/24 122/76      Wt Readings from Last 3 Encounters:   10/24/24 (!) 172 kg (379 lb)   05/09/24 (!) 151 kg (332 lb)   05/06/24 (!) 161 kg (356 lb)        Lab on 10/24/2024   Component Date Value    Prostate Specific AG 10/24/2024 1.23     Cholesterol 10/24/2024 142     HDL-Cholesterol 10/24/2024 72.9     Cholesterol/HDL Ratio 10/24/2024 1.9     LDL Calculated 10/24/2024 52     VLDL 10/24/2024 17     Triglycerides 10/24/2024 86     Non HDL Cholesterol 10/24/2024 69      Thyroid Stimulating Horm* 10/24/2024 4.79 (H)     Glucose 10/24/2024 186 (H)     Sodium 10/24/2024 134 (L)     Potassium 10/24/2024 4.3     Chloride 10/24/2024 101     Bicarbonate 10/24/2024 26     Anion Gap 10/24/2024 11     Urea Nitrogen 10/24/2024 13     Creatinine 10/24/2024 0.86     eGFR 10/24/2024 >90     Calcium 10/24/2024 9.3     Albumin 10/24/2024 4.4     Alkaline Phosphatase 10/24/2024 56     Total Protein 10/24/2024 7.4     AST 10/24/2024 30     Bilirubin, Total 10/24/2024 0.5     ALT 10/24/2024 43     WBC 10/24/2024 6.7     nRBC 10/24/2024 0.0     RBC 10/24/2024 4.58     Hemoglobin 10/24/2024 14.5     Hematocrit 10/24/2024 43.2     MCV 10/24/2024 94     MCH 10/24/2024 31.7     MCHC 10/24/2024 33.6     RDW 10/24/2024 13.0     Platelets 10/24/2024 273     Neutrophils % 10/24/2024 64.9     Immature Granulocytes %,* 10/24/2024 0.1     Lymphocytes % 10/24/2024 27.3     Monocytes % 10/24/2024 6.9     Eosinophils % 10/24/2024 0.4     Basophils % 10/24/2024 0.4     Neutrophils Absolute 10/24/2024 4.35     Immature Granulocytes Ab* 10/24/2024 0.01     Lymphocytes Absolute 10/24/2024 1.83     Monocytes Absolute 10/24/2024 0.46     Eosinophils Absolute 10/24/2024 0.03     Basophils Absolute 10/24/2024 0.03    Office Visit on 10/24/2024   Component Date Value    POC Color, Urine 10/24/2024 Yellow     POC Appearance, Urine 10/24/2024 Clear     POC Glucose, Urine 10/24/2024 NEGATIVE     POC Bilirubin, Urine 10/24/2024 NEGATIVE     POC Ketones, Urine 10/24/2024 NEGATIVE     POC Specific Gravity, Ur* 10/24/2024 1.020     POC Blood, Urine 10/24/2024 NEGATIVE     POC PH, Urine 10/24/2024 6.0     POC Protein, Urine 10/24/2024 NEGATIVE     POC Urobilinogen, Urine 10/24/2024 0.2     Poc Nitrite, Urine 10/24/2024 NEGATIVE     POC Leukocytes, Urine 10/24/2024 NEGATIVE      Physical Exam  CONSTITUTIONAL - well nourished, well developed, obese white male, looks like stated age, in no acute distress, not ill-appearing, and not  tired appearing  SKIN - normal skin color and pigmentation, normal skin turgor without rash, lesions, or nodules visualized  HEAD - no trauma, normocephalic  EYES - pupils are equal and reactive to light, extraocular muscles are intact, and normal external exam  ENT - TM's intact, no injection, no signs of infection, uvula midline, normal tongue movement and throat normal, no exudate  NECK - supple without rigidity, no neck mass was observed, no thyromegaly or thyroid nodules  CHEST - clear to auscultation, no wheezing, no crackles and no rales, good effort  CARDIAC - regular rate and regular rhythm, no skipped beats, no murmur  ABDOMEN - no organomegaly, soft, nontender, nondistended, normal bowel sounds, no guarding/rebound/rigidity  EXTREMITIES - no obvious or evident edema, no obvious or evident deformities  NEUROLOGICAL - normal gait, normal balance, normal motor, no ataxia; alert, oriented and no focal signs  PSYCHIATRIC - alert, pleasant and cordial, age-appropriate  IMMUNOLOGIC - no cervical lymphadenopathy    Assessment/Plan   Problem List Items Addressed This Visit       Essential hypertension     BP was 136/80 on repeat, which is he will not at goal. You did however tell me that you did not take your meds today yet. Suggested increasing lisinopril, however you elected to check your BP daily for the next 1-2 weeks and report the log to us. You are hesitant to increase your meds at this time. We will make adjustments as necessary once you report your BP log.          Relevant Medications    amLODIPine (Norvasc) 10 mg tablet    lisinopril 20 mg tablet    Mixed hyperlipidemia     Please obtain fasting blood work to ensure proper dosing of your Lipitor  We will notify of test results once available         Relevant Medications    atorvastatin (Lipitor) 20 mg tablet    Type 2 diabetes mellitus with obesity (Multi)     I suggested adding another medication to the regimen, but you are hesitant at this time. Would  like to continue your diet and exercise regimen instead. Refills of glimepiride and metformin sent to preferred pharmacy.          Relevant Medications    atorvastatin (Lipitor) 20 mg tablet    glimepiride (Amaryl) 4 mg tablet    metFORMIN XR (Glucophage-XR) 500 mg 24 hr tablet    Other Relevant Orders    Albumin-Creatinine Ratio, Urine Random    Hemoglobin A1C    Vitamin D deficiency    Relevant Medications    cholecalciferol (Vitamin D-3) 50 mcg (2,000 unit) capsule    Annual physical exam - Primary     Complete history and physical examination was performed  EKG reveals normal sinus rhythm without acute changes  Flu vaccine declined today  Second shingrix provided today  Colonoscopy UTD due again 2029  We will notify of test results once available and make treatment recommendations accordingly         Relevant Orders    Prostate Specific Antigen (Completed)    Lipid Panel (Completed)    TSH with reflex to Free T4 if abnormal (Completed)    Comprehensive Metabolic Panel (Completed)    CBC and Auto Differential (Completed)    HIV 1/2 Antigen/Antibody Screen with Reflex to Confirmation    Hepatitis C Antibody    POCT UA (nonautomated) manually resulted (Completed)    ECG 12 Lead (Completed)    Primary insomnia     Stable, continue with Trazodone 50 mg nightly. Refills sent to pharmacy.          Relevant Medications    traZODone (Desyrel) 100 mg tablet    Other constipation     Stable with docusate. Refills sent to pharmacy.          Relevant Medications    docusate sodium (Colace) 100 mg capsule     Other Visit Diagnoses       Encounter for screening for HIV        Relevant Orders    HIV 1/2 Antigen/Antibody Screen with Reflex to Confirmation    Need for hepatitis C screening test        Relevant Orders    Hepatitis C Antibody    Prostate cancer screening        Relevant Orders    Prostate Specific Antigen (Completed)    Need for shingles vaccine        Shingrix No. 2 provided today, that completes the series     Relevant Orders    Zoster vaccine, recombinant, adult (SHINGRIX) (Completed)

## 2024-10-24 NOTE — ASSESSMENT & PLAN NOTE
BP was 136/80 on repeat, which is he will not at goal. You did however tell me that you did not take your meds today yet. Suggested increasing lisinopril, however you elected to check your BP daily for the next 1-2 weeks and report the log to us. You are hesitant to increase your meds at this time. We will make adjustments as necessary once you report your BP log.

## 2024-10-24 NOTE — ASSESSMENT & PLAN NOTE
I suggested adding another medication to the regimen, but you are hesitant at this time. Would like to continue your diet and exercise regimen instead. Refills of glimepiride and metformin sent to preferred pharmacy.

## 2024-10-24 NOTE — ASSESSMENT & PLAN NOTE
Complete history and physical examination was performed  EKG reveals normal sinus rhythm without acute changes  Flu vaccine declined today  Second shingrix provided today  Colonoscopy UTD due again 2029  We will notify of test results once available and make treatment recommendations accordingly

## 2024-10-25 LAB
EST. AVERAGE GLUCOSE BLD GHB EST-MCNC: 180 MG/DL
HBA1C MFR BLD: 7.9 %

## 2024-10-25 NOTE — RESULT ENCOUNTER NOTE
Your TSH level is slightly elevated at 4.79, which suggests that your thyroid might not be functioning optimally. However, it is still producing enough thyroxine, so there is no immediate concern about your thyroid health.    Your sodium level has improved to 134 from 132. Additionally, your kidney and liver function, prostate cancer screening, cholesterol, and blood count results are all excellent.    We are still awaiting your hemoglobin A1c results, which will give us more information about your blood sugar control. I have sent your current medications to the pharmacy at the same doses. Depending on your A1c results, we may need to consider some adjustments or additions to your medication regimen to better manage your blood sugar if needed.    Please monitor your blood pressure closely, as I am concerned about its elevation and the potential adverse effects it can have on your health. Increasing your lisinopril dosage is not a big deal, and although I know you weren't initially interested in doing so, it might be a wise choice. If you are going to take medication, it's important that it works effectively for you.

## 2024-10-26 NOTE — RESULT ENCOUNTER NOTE
Your Hemoglobin A1c has increased to 7.9%, up from previous levels of 7% and 6.9%. This trend suggests that your blood sugar levels are not as controlled as we would like, which highlights the need to consider adjusting your diabetes management plan.    Currently, you are taking glimepiride 4 mg daily and metformin 2000 mg daily. While these medications have been effective in the past, it may be time to explore additional or alternative treatments to better manage your blood sugar levels. Uncontrolled blood sugar can lead to serious health complications, including stroke, heart disease, and vascular and neurological issues.    I understand that cost can be a concern, as many new medications are branded and can be expensive. However, it is important to consider the long-term benefits of better blood sugar control. We can discuss various options, including oral medications or potentially an injectable treatment that we have previously mentioned.    Please know that I am here to support you in making the best decision for your health. I recommend scheduling an appointment to discuss your options in more detail and to develop a plan tailored to your needs and circumstances. It could be virtual if you choose, as well

## 2024-10-28 ENCOUNTER — TELEMEDICINE (OUTPATIENT)
Dept: PRIMARY CARE | Facility: CLINIC | Age: 54
End: 2024-10-28
Payer: COMMERCIAL

## 2024-10-28 DIAGNOSIS — R59.9 GLANDS SWOLLEN: ICD-10-CM

## 2024-10-28 DIAGNOSIS — I10 ESSENTIAL HYPERTENSION: ICD-10-CM

## 2024-10-28 DIAGNOSIS — E11.69 TYPE 2 DIABETES MELLITUS WITH OBESITY (MULTI): Primary | ICD-10-CM

## 2024-10-28 DIAGNOSIS — E66.9 TYPE 2 DIABETES MELLITUS WITH OBESITY (MULTI): Primary | ICD-10-CM

## 2024-10-28 DIAGNOSIS — R79.89 TSH ELEVATION: ICD-10-CM

## 2024-10-28 PROCEDURE — 3051F HG A1C>EQUAL 7.0%<8.0%: CPT | Performed by: FAMILY MEDICINE

## 2024-10-28 PROCEDURE — 3048F LDL-C <100 MG/DL: CPT | Performed by: FAMILY MEDICINE

## 2024-10-28 PROCEDURE — 99214 OFFICE O/P EST MOD 30 MIN: CPT | Performed by: FAMILY MEDICINE

## 2024-10-28 PROCEDURE — 4010F ACE/ARB THERAPY RXD/TAKEN: CPT | Performed by: FAMILY MEDICINE

## 2024-10-28 PROCEDURE — 1036F TOBACCO NON-USER: CPT | Performed by: FAMILY MEDICINE

## 2024-10-28 RX ORDER — TIRZEPATIDE 5 MG/.5ML
5 INJECTION, SOLUTION SUBCUTANEOUS WEEKLY
Qty: 2 ML | Refills: 4 | Status: SHIPPED | OUTPATIENT
Start: 2024-11-25 | End: 2025-04-08

## 2024-10-28 RX ORDER — TIRZEPATIDE 2.5 MG/.5ML
2.5 INJECTION, SOLUTION SUBCUTANEOUS
Qty: 2 ML | Refills: 0 | Status: SHIPPED | OUTPATIENT
Start: 2024-10-28 | End: 2024-11-28

## 2024-10-28 RX ORDER — LISINOPRIL 40 MG/1
40 TABLET ORAL DAILY
Qty: 90 TABLET | Refills: 1 | Status: SHIPPED | OUTPATIENT
Start: 2024-10-28 | End: 2025-04-26

## 2024-10-30 DIAGNOSIS — I10 ESSENTIAL HYPERTENSION: Primary | ICD-10-CM

## 2024-10-30 RX ORDER — HYDROCHLOROTHIAZIDE 25 MG/1
25 TABLET ORAL EVERY MORNING
Qty: 90 TABLET | Refills: 1 | Status: SHIPPED | OUTPATIENT
Start: 2024-10-30 | End: 2025-04-28

## 2024-10-31 PROCEDURE — RXMED WILLOW AMBULATORY MEDICATION CHARGE

## 2024-11-04 ENCOUNTER — PHARMACY VISIT (OUTPATIENT)
Dept: PHARMACY | Facility: CLINIC | Age: 54
End: 2024-11-04
Payer: COMMERCIAL

## 2024-11-15 DIAGNOSIS — I10 ESSENTIAL HYPERTENSION: ICD-10-CM

## 2024-11-15 DIAGNOSIS — I10 ESSENTIAL HYPERTENSION: Primary | ICD-10-CM

## 2024-11-15 RX ORDER — NEBIVOLOL 5 MG/1
5 TABLET ORAL DAILY
Qty: 30 TABLET | Refills: 0 | OUTPATIENT
Start: 2024-11-15 | End: 2024-12-15

## 2024-11-15 RX ORDER — NEBIVOLOL 5 MG/1
5 TABLET ORAL DAILY
Qty: 90 TABLET | Refills: 1 | Status: SHIPPED | OUTPATIENT
Start: 2024-11-15 | End: 2025-05-14

## 2024-12-06 DIAGNOSIS — I10 UNCONTROLLED HYPERTENSION: Primary | ICD-10-CM

## 2024-12-09 DIAGNOSIS — I10 ESSENTIAL HYPERTENSION: Primary | ICD-10-CM

## 2024-12-09 RX ORDER — OLMESARTAN MEDOXOMIL 40 MG/1
40 TABLET ORAL DAILY
Qty: 30 TABLET | Refills: 1 | Status: SHIPPED | OUTPATIENT
Start: 2024-12-09 | End: 2025-02-07

## 2024-12-10 ENCOUNTER — OFFICE VISIT (OUTPATIENT)
Dept: CARDIOLOGY | Facility: CLINIC | Age: 54
End: 2024-12-10
Payer: COMMERCIAL

## 2024-12-10 VITALS
DIASTOLIC BLOOD PRESSURE: 76 MMHG | HEIGHT: 75 IN | SYSTOLIC BLOOD PRESSURE: 125 MMHG | WEIGHT: 315 LBS | HEART RATE: 90 BPM | BODY MASS INDEX: 39.17 KG/M2 | TEMPERATURE: 97.6 F

## 2024-12-10 DIAGNOSIS — I10 UNCONTROLLED HYPERTENSION: ICD-10-CM

## 2024-12-10 DIAGNOSIS — I1A.0 RESISTANT HYPERTENSION: Primary | ICD-10-CM

## 2024-12-10 DIAGNOSIS — I25.10 CORONARY ARTERY CALCIFICATION SEEN ON CT SCAN: ICD-10-CM

## 2024-12-10 DIAGNOSIS — G47.33 SLEEP APNEA, OBSTRUCTIVE: ICD-10-CM

## 2024-12-10 DIAGNOSIS — E78.2 MIXED HYPERLIPIDEMIA: ICD-10-CM

## 2024-12-10 PROCEDURE — 4010F ACE/ARB THERAPY RXD/TAKEN: CPT | Performed by: INTERNAL MEDICINE

## 2024-12-10 PROCEDURE — RXMED WILLOW AMBULATORY MEDICATION CHARGE

## 2024-12-10 PROCEDURE — 3078F DIAST BP <80 MM HG: CPT | Performed by: INTERNAL MEDICINE

## 2024-12-10 PROCEDURE — 1036F TOBACCO NON-USER: CPT | Performed by: INTERNAL MEDICINE

## 2024-12-10 PROCEDURE — 3048F LDL-C <100 MG/DL: CPT | Performed by: INTERNAL MEDICINE

## 2024-12-10 PROCEDURE — 3008F BODY MASS INDEX DOCD: CPT | Performed by: INTERNAL MEDICINE

## 2024-12-10 PROCEDURE — 3074F SYST BP LT 130 MM HG: CPT | Performed by: INTERNAL MEDICINE

## 2024-12-10 PROCEDURE — 99204 OFFICE O/P NEW MOD 45 MIN: CPT | Performed by: INTERNAL MEDICINE

## 2024-12-10 PROCEDURE — 99214 OFFICE O/P EST MOD 30 MIN: CPT | Performed by: INTERNAL MEDICINE

## 2024-12-10 PROCEDURE — 3051F HG A1C>EQUAL 7.0%<8.0%: CPT | Performed by: INTERNAL MEDICINE

## 2024-12-10 NOTE — PROGRESS NOTES
Chief Complaint:   Hypertension     History of Present Illness     Axel Mohan is a 54 y.o. male presenting with refractory hypertension in setting of obesity.  For 10-12 years he has been controlled on ACE alone and in the last year, BP resistant to multiple meds.  Just changed to ARB.  Compliant with CPAP.  Weight has not changed in last year.  Social EtOH.  No exercise.  Denies CP or dyspnea.  No cardiac Hx.  No Hx PAD.  On Monjoro for 1 month.       Previous History     Past Medical History:  He has a past medical history of Essential hypertension (05/06/2024), Hyponatremia (05/09/2024), Mixed hyperlipidemia (06/29/2022), Resistant hypertension (12/10/2024), and Type 2 diabetes mellitus with obesity (Multi) (05/06/2024).    Past Surgical History:  He has no past surgical history on file.      Social History:  He reports that he has never smoked. He has never used smokeless tobacco. He reports current alcohol use. He reports that he does not use drugs.    Family History:  Family History   Problem Relation Name Age of Onset    Atrial fibrillation Father          Allergies:  Patient has no known allergies.    Outpatient Medications:  Current Outpatient Medications   Medication Instructions    amLODIPine (NORVASC) 10 mg, oral, Daily    atorvastatin (LIPITOR) 20 mg, oral, Nightly    cholecalciferol (VITAMIN D-3) 50 mcg, oral, Daily    clotrimazole (Lotrimin) 1 % cream Topical, 2 times daily, apply to affected area    diatrizoate jacob-diatrizoat sod (Gastrografin) solution Take 15 ml (1 tablespoon) by mouth before bed, discard remaining medication.    docusate sodium (COLACE) 100 mg, oral, Nightly    glimepiride (AMARYL) 4 mg, oral, Daily with breakfast    hydroCHLOROthiazide (HYDRODIURIL) 25 mg, oral, Every morning    metFORMIN XR (GLUCOPHAGE-XR) 1,000 mg, oral, 2 times daily    Mounjaro 5 mg, subcutaneous, Weekly    nebivolol (BYSTOLIC) 5 mg, oral, Daily    olmesartan (BENICAR) 40 mg, oral, Daily    traZODone  "(DESYREL) 100 mg, oral, Nightly       Physical Examination   Vitals:  Visit Vitals  /76   Pulse 90   Temp 36.4 °C (97.6 °F)   Ht 1.905 m (6' 3\")   Wt (!) 168 kg (371 lb)   BMI 46.37 kg/m²   Smoking Status Never   BSA 2.98 m²    Physical Exam  Vitals reviewed.   Constitutional:       General: He is not in acute distress.     Appearance: Normal appearance.   HENT:      Head: Normocephalic and atraumatic.      Nose: Nose normal.   Eyes:      Conjunctiva/sclera: Conjunctivae normal.   Cardiovascular:      Rate and Rhythm: Normal rate and regular rhythm.      Pulses: Normal pulses.      Heart sounds: No murmur heard.  Pulmonary:      Effort: Pulmonary effort is normal. No respiratory distress.      Breath sounds: Normal breath sounds. No wheezing, rhonchi or rales.   Abdominal:      General: Bowel sounds are normal. There is no distension.      Palpations: Abdomen is soft.      Tenderness: There is no abdominal tenderness.   Musculoskeletal:         General: No swelling.      Right lower leg: No edema.      Left lower leg: No edema.   Skin:     General: Skin is warm and dry.      Capillary Refill: Capillary refill takes less than 2 seconds.   Neurological:      General: No focal deficit present.      Mental Status: He is alert.   Psychiatric:         Mood and Affect: Mood normal.             Labs/Imaging/Cardiac Studies     Last Labs:  CBC -  Lab Results   Component Value Date    WBC 6.7 10/24/2024    HGB 14.5 10/24/2024    HCT 43.2 10/24/2024    MCV 94 10/24/2024     10/24/2024       CMP -  Lab Results   Component Value Date    CALCIUM 9.3 10/24/2024    PROT 7.4 10/24/2024    ALBUMIN 4.4 10/24/2024    AST 30 10/24/2024    ALT 43 10/24/2024    ALKPHOS 56 10/24/2024    BILITOT 0.5 10/24/2024       LIPID PANEL -   Lab Results   Component Value Date    CHOL 142 10/24/2024    HDL 72.9 10/24/2024    CHHDL 1.9 10/24/2024    VLDL 17 10/24/2024    TRIG 86 10/24/2024    NHDL 69 10/24/2024       RENAL FUNCTION PANEL - "   Lab Results   Component Value Date    K 4.3 10/24/2024       Lab Results   Component Value Date    HGBA1C 7.9 (H) 10/24/2024       Reviewed ECG  Reviewed CAC 2021 =25  Reviewed Labs    Echo:  No echocardiogram results found for the past 12 months       Assessment and Recommendations     Assessment/Plan   1. Resistant hypertension (Primary)  May have had ACE escape and now controlled with ARB + effects of Mounjaro. May be able to stop Bystolic if stays controlled.  Doubt secondary cause (other than severe obesity).    2. Mixed hyperlipidemia  The patient's lipids are well controlled on chronic atorvastatin therapy and they are meeting their goal LDL cholesterol per the ACC/AHA guidelines.      3. Coronary artery calcification seen on CT scan  Low CAC.  No angina.  LDL excellent.    4. Sleep apnea, obstructive  Compliant with CPAP.     Axel Mohan will return in 1 month for an office visit with Echo.       Brett Ariza MD    Exclusive of any other services or procedures performed, I, Brett Ariza MD , spent 30 minutes in duration for this visit today.  This time consisted of chart review, obtaining history, and/or performing the exam as documented above as well as documenting the clinical information for the encounter in the electronic record, discussing treatment options, plans, and/or goals with patient, family, and/or caregiver, refilling medications, updating the electronic record, ordering medicines, lab work, imaging, referrals, and/or procedures as documented above and communicating with other [facility] professionals. I have discussed the results of laboratory, radiology, and cardiology studies with the patient and their family/caregiver.

## 2024-12-11 ENCOUNTER — PHARMACY VISIT (OUTPATIENT)
Dept: PHARMACY | Facility: CLINIC | Age: 54
End: 2024-12-11
Payer: COMMERCIAL

## 2024-12-24 ENCOUNTER — APPOINTMENT (OUTPATIENT)
Dept: PRIMARY CARE | Facility: CLINIC | Age: 54
End: 2024-12-24
Payer: COMMERCIAL

## 2024-12-24 ENCOUNTER — HOSPITAL ENCOUNTER (OUTPATIENT)
Dept: RADIOLOGY | Facility: CLINIC | Age: 54
Discharge: HOME | End: 2024-12-24
Payer: COMMERCIAL

## 2024-12-24 VITALS
HEIGHT: 75 IN | SYSTOLIC BLOOD PRESSURE: 130 MMHG | BODY MASS INDEX: 39.17 KG/M2 | WEIGHT: 315 LBS | DIASTOLIC BLOOD PRESSURE: 72 MMHG | HEART RATE: 68 BPM

## 2024-12-24 DIAGNOSIS — D48.5 NEOPLASM OF UNCERTAIN BEHAVIOR OF SKIN OF THIGH: ICD-10-CM

## 2024-12-24 DIAGNOSIS — S70.352A: ICD-10-CM

## 2024-12-24 DIAGNOSIS — M25.552 LEFT HIP PAIN: Primary | ICD-10-CM

## 2024-12-24 DIAGNOSIS — M16.12 PRIMARY OSTEOARTHRITIS OF LEFT HIP: ICD-10-CM

## 2024-12-24 DIAGNOSIS — M25.552 LEFT HIP PAIN: ICD-10-CM

## 2024-12-24 PROCEDURE — 3075F SYST BP GE 130 - 139MM HG: CPT | Performed by: FAMILY MEDICINE

## 2024-12-24 PROCEDURE — 99214 OFFICE O/P EST MOD 30 MIN: CPT | Performed by: FAMILY MEDICINE

## 2024-12-24 PROCEDURE — 4010F ACE/ARB THERAPY RXD/TAKEN: CPT | Performed by: FAMILY MEDICINE

## 2024-12-24 PROCEDURE — 10120 INC&RMVL FB SUBQ TISS SMPL: CPT | Performed by: FAMILY MEDICINE

## 2024-12-24 PROCEDURE — 3008F BODY MASS INDEX DOCD: CPT | Performed by: FAMILY MEDICINE

## 2024-12-24 PROCEDURE — 3048F LDL-C <100 MG/DL: CPT | Performed by: FAMILY MEDICINE

## 2024-12-24 PROCEDURE — 73502 X-RAY EXAM HIP UNI 2-3 VIEWS: CPT | Mod: LT

## 2024-12-24 PROCEDURE — 11300 SHAVE SKIN LESION 0.5 CM/<: CPT | Performed by: FAMILY MEDICINE

## 2024-12-24 PROCEDURE — 1036F TOBACCO NON-USER: CPT | Performed by: FAMILY MEDICINE

## 2024-12-24 PROCEDURE — 3078F DIAST BP <80 MM HG: CPT | Performed by: FAMILY MEDICINE

## 2024-12-24 PROCEDURE — 3051F HG A1C>EQUAL 7.0%<8.0%: CPT | Performed by: FAMILY MEDICINE

## 2024-12-24 RX ORDER — MELOXICAM 15 MG/1
15 TABLET ORAL DAILY PRN
Qty: 30 TABLET | Refills: 0 | Status: SHIPPED | OUTPATIENT
Start: 2024-12-24

## 2024-12-24 ASSESSMENT — PATIENT HEALTH QUESTIONNAIRE - PHQ9
2. FEELING DOWN, DEPRESSED OR HOPELESS: NOT AT ALL
SUM OF ALL RESPONSES TO PHQ9 QUESTIONS 1 AND 2: 0
1. LITTLE INTEREST OR PLEASURE IN DOING THINGS: NOT AT ALL

## 2024-12-24 NOTE — ASSESSMENT & PLAN NOTE
While I don’t yet have the official radiology report, based on my initial review, there don’t appear to be any acute findings. However, I do suspect there may be moderate to severe underlying arthritic changes in the hip area, but no fractures are noted.    The radiologist will provide a final interpretation, and I’ll notify you if there are any updates.    I’m going to refer you to a sports medicine specialist to discuss the possibility of an intra-articular hip injection to help provide relief. In the meantime, I’m prescribing meloxicam, a strong nonsteroidal anti-inflammatory, which can be used on a daily basis as needed for your hip pain. While the package insert may suggest avoiding this medication if you have elevated blood pressure, I’m not expecting you to be on it daily, and I wanted to provide you with some relief over the holidays.    At this point, I’m not certain if surgical intervention will be necessary, but it could be something we’ll consider in the future.

## 2024-12-24 NOTE — PROGRESS NOTES
Subjective   Patient ID: Axel Mohan is a 54 y.o. male who presents for Hip Pain (Left hip/Without injury).    Past Medical, Surgical, and Family History reviewed and updated in chart.    Reviewed all medications by prescribing practitioner or clinical pharmacist (such as prescriptions, OTCs, herbal therapies and supplements) and documented in the medical record.    HPI  1. Left Hip Pain    Axel presents with left hip pain that began about two weeks ago. The pain is localized around the left hip joint and occasionally radiates into the left groin. He reports no inciting incident and experiences increased discomfort when putting pressure on the hip or adducting the hip. The pain is also aggravated when transitioning from sitting to standing. Axel has been taking over-the-counter Tylenol and Motrin dual action, which has provided some relief.    2. Left Thigh Skin Lesion    Axel notes a lesion on his left thigh that is black with an erythematous border. He states it has been present for about two weeks but has not caused significant discomfort. He has not noticed it before and is unsure if there is a foreign body in it. He mentions that the lesion often forms a scab on the outside, and when scratched, it seems to recur.    3. Hypertension    Axel’s ACE inhibitor was discontinued and replaced with olmesartan. He has been seen by Dr. Ariza, and his blood pressure has been well-controlled on this new regimen. Axel reports feeling good with no symptoms of acute coronary syndrome (ACS).    Review of Systems  All pertinent positive symptoms are included in the history of present illness.    All other systems have been reviewed and are negative and noncontributory to this patient's current ailments.    Past Medical History:   Diagnosis Date    Essential hypertension 05/06/2024    Hyponatremia 05/09/2024    Mixed hyperlipidemia 06/29/2022    Resistant hypertension 12/10/2024    Type 2 diabetes mellitus with obesity (Multi)  "05/06/2024     History reviewed. No pertinent surgical history.  Social History     Tobacco Use    Smoking status: Never    Smokeless tobacco: Never   Substance Use Topics    Alcohol use: Yes    Drug use: Never     Family History   Problem Relation Name Age of Onset    Atrial fibrillation Father       Immunization History   Administered Date(s) Administered    Pneumococcal conjugate vaccine, 20-valent (PREVNAR 20) 06/29/2022    Pneumococcal polysaccharide vaccine, 23-valent, age 2 years and older (PNEUMOVAX 23) 01/08/2021    Td vaccine, age 7 years and older (TENIVAC) 04/20/2017    Zoster vaccine, recombinant, adult (SHINGRIX) 05/06/2024, 10/24/2024     Current Outpatient Medications   Medication Instructions    amLODIPine (NORVASC) 10 mg, oral, Daily    atorvastatin (LIPITOR) 20 mg, oral, Nightly    cholecalciferol (VITAMIN D-3) 50 mcg, oral, Daily    clotrimazole (Lotrimin) 1 % cream Topical, 2 times daily, apply to affected area    diatrizoate jacob-diatrizoat sod (Gastrografin) solution Take 15 ml (1 tablespoon) by mouth before bed, discard remaining medication.    docusate sodium (COLACE) 100 mg, oral, Nightly    glimepiride (AMARYL) 4 mg, oral, Daily with breakfast    hydroCHLOROthiazide (HYDRODIURIL) 25 mg, oral, Every morning    meloxicam (MOBIC) 15 mg, oral, Daily PRN    metFORMIN XR (GLUCOPHAGE-XR) 1,000 mg, oral, 2 times daily    Mounjaro 5 mg/0.5 mL pen injector Inject 5 mg under the skin 1 (one) time per week for 20 doses. Do not fill before November 25, 2024.    nebivolol (BYSTOLIC) 5 mg, oral, Daily    olmesartan (BENICAR) 40 mg, oral, Daily    traZODone (DESYREL) 100 mg, oral, Nightly     No Known Allergies    Objective   Vitals:    12/24/24 0819   BP: 130/72   Pulse: 68   Weight: 149 kg (329 lb)   Height: 1.905 m (6' 3\")     Body mass index is 41.12 kg/m².    BP Readings from Last 3 Encounters:   12/24/24 130/72   12/10/24 125/76   10/24/24 136/80      Wt Readings from Last 3 Encounters:   12/24/24 " 149 kg (329 lb)   12/10/24 (!) 168 kg (371 lb)   10/24/24 (!) 172 kg (379 lb)      Physical Exam  CONSTITUTIONAL - well nourished, well developed, looks like stated age, in no acute distress, not ill-appearing, and not tired appearing  SKIN - normal skin color and pigmentation, normal skin turgor without rash, lesions, or nodules visualized  HEAD - no trauma, normocephalic  NECK - supple without rigidity, no neck mass was observed, no thyromegaly or thyroid nodules  CHEST - clear to auscultation, no wheezing, no crackles and no rales, good effort  CARDIAC - regular rate and regular rhythm, no skipped beats, no murmur  EXTREMITIES - significant reproducible tenderness over the greater trochanteric area, and when we did the RAISA test, there was significant pain through the left groin; very slight pain with eversion or inversion of the left lower extremity at the hip joint  NEUROLOGICAL - normal gait, normal balance, normal motor, no ataxia, DTRs equal and symmetrical; alert, oriented and no focal signs  PSYCHIATRIC - alert, pleasant and cordial, age-appropriate    Shaving Epidermal/Dermal    Date/Time: 12/24/2024 1:46 PM    Performed by: Scott Hinojosa DO  Authorized by: Scott Hinojosa DO    Number of Lesions: 1  Lesion 1:     Body area: lower extremity    Lower extremity location: L upper leg    Initial size (mm): 1    Final defect size (mm): 1    Malignancy: malignancy unknown      Destruction method: curettage      Comments:  2% lidocaine with epinephrine into lesion(s) after cleansing in sterile fashion; small sterilized razor used for shave biopsy; minimal bleeding with Drysol cautery; tolerated well without complications; bacitracin ointment along with bandage was applied    While performing the aforementioned shave biopsy, as I was removing the superficial layer of skin, there was a noted piece of wood embedded into the skin below that superficially flaky scabbed surface so that was removed as  well.    Assessment/Plan   Problem List Items Addressed This Visit       Left hip pain - Primary     Due to the fact you continue to have significant pain, I suspect there will be some underlying arthritis based on your history and physical  X-ray was recommended and we will follow-up thereafter         Relevant Medications    meloxicam (Mobic) 15 mg tablet    Other Relevant Orders    XR hip left with pelvis when performed 2 or 3 views    Neoplasm of uncertain behavior of skin of thigh     Shave biopsy done, will follow up with results  Initially, I was concerned about the possibility of a malignancy but this could have been a reaction from the foreign object embedded below  We will still send for culture to ensure no underlying pathology         Relevant Orders    Dermatopathology- DERM LAB    Shaving Epidermal/Dermal    Primary osteoarthritis of left hip     While I don’t yet have the official radiology report, based on my initial review, there don’t appear to be any acute findings. However, I do suspect there may be moderate to severe underlying arthritic changes in the hip area, but no fractures are noted.    The radiologist will provide a final interpretation, and I’ll notify you if there are any updates.    I’m going to refer you to a sports medicine specialist to discuss the possibility of an intra-articular hip injection to help provide relief. In the meantime, I’m prescribing meloxicam, a strong nonsteroidal anti-inflammatory, which can be used on a daily basis as needed for your hip pain. While the package insert may suggest avoiding this medication if you have elevated blood pressure, I’m not expecting you to be on it daily, and I wanted to provide you with some relief over the holidays.    At this point, I’m not certain if surgical intervention will be necessary, but it could be something we’ll consider in the future.         Relevant Medications    meloxicam (Mobic) 15 mg tablet    Superficial foreign  body of left thigh     Small wood fragment approximately 1 mm to 1.5 mm in length was removed successfully without any residual remnant noted

## 2024-12-24 NOTE — ASSESSMENT & PLAN NOTE
Small wood fragment approximately 1 mm to 1.5 mm in length was removed successfully without any residual remnant noted

## 2024-12-24 NOTE — ASSESSMENT & PLAN NOTE
Due to the fact you continue to have significant pain, I suspect there will be some underlying arthritis based on your history and physical  X-ray was recommended and we will follow-up thereafter

## 2024-12-27 LAB
LABORATORY COMMENT REPORT: NORMAL
PATH REPORT.FINAL DX SPEC: NORMAL
PATH REPORT.GROSS SPEC: NORMAL
PATH REPORT.MICROSCOPIC SPEC OTHER STN: NORMAL
PATH REPORT.RELEVANT HX SPEC: NORMAL
PATH REPORT.TOTAL CANCER: NORMAL

## 2024-12-28 NOTE — RESULT ENCOUNTER NOTE
It turns out that the lesion that we removed was a reaction to the foreign body that was embedded, and the embedded foreign body was identified as a splinter

## 2024-12-31 DIAGNOSIS — M25.552 LEFT HIP PAIN: Primary | ICD-10-CM

## 2024-12-31 DIAGNOSIS — M16.12 PRIMARY OSTEOARTHRITIS OF LEFT HIP: ICD-10-CM

## 2025-01-03 ENCOUNTER — OFFICE VISIT (OUTPATIENT)
Dept: ORTHOPEDIC SURGERY | Facility: CLINIC | Age: 55
End: 2025-01-03
Payer: COMMERCIAL

## 2025-01-03 DIAGNOSIS — M16.12 PRIMARY OSTEOARTHRITIS OF LEFT HIP: ICD-10-CM

## 2025-01-03 DIAGNOSIS — M25.552 LEFT HIP PAIN: Primary | ICD-10-CM

## 2025-01-03 DIAGNOSIS — K59.09 OTHER CONSTIPATION: ICD-10-CM

## 2025-01-03 PROCEDURE — 4010F ACE/ARB THERAPY RXD/TAKEN: CPT | Performed by: ORTHOPAEDIC SURGERY

## 2025-01-03 PROCEDURE — 99204 OFFICE O/P NEW MOD 45 MIN: CPT | Performed by: ORTHOPAEDIC SURGERY

## 2025-01-03 PROCEDURE — 1036F TOBACCO NON-USER: CPT | Performed by: ORTHOPAEDIC SURGERY

## 2025-01-03 ASSESSMENT — PAIN - FUNCTIONAL ASSESSMENT: PAIN_FUNCTIONAL_ASSESSMENT: 0-10

## 2025-01-03 ASSESSMENT — PAIN SCALES - GENERAL: PAINLEVEL_OUTOF10: 5 - MODERATE PAIN

## 2025-01-03 NOTE — PROGRESS NOTES
This is a consultation from Dr. Scott Hinojosa DO for   Chief Complaint   Patient presents with    Left Hip - Pain       This is a 54 y.o. male who presents for evaluation of left hip pain.  This is a new issue for the patient is going on for few months, complains of sharp pain over the anterior hip and groin into the lateral hip.  No pain going down his leg no numbness or tingling.  He has never had injections or surgery on his hip.  He has tried over-the-counter anti-inflammatories which are helping.  He is not using a cane or any type of assistive device.    Physical Exam    There has been no interval change in this patient's past medical, surgical, medications, allergies, family history or social history since the most recent visit to a provider within our department. 14 point review of systems was performed, reviewed, and negative except for pertinent positives documented in the history of present illness.     Constitutional: well developed, well nourished male in no acute distress  Psychiatric: normal mood, appropriate affect  Eyes: sclera anicteric  HENT: normocephalic/atraumatic  CV: regular rate and rhythm   Respiratory: non labored breathing  Integumentary: no rash  Neurological: moves all extremities    Left hip exam: skin normal, no abrasions, wounds, or lacerations. nttp over greater trochanter. negative log roll, negative dean's test. flexion to 90 degrees without pain. no pain with flexion abduction and external rotation, reproduction of groin moderate degenerative changes with pain with flexion adduction and internal rotation. neurovascularly intact distally        Xrays were ordered by me, they were reviewed and independently interpreted by me today, they show joint space narrowing in the left hip    Procedures      Impression/Plan: This is a 54 y.o. male with left hip arthritis.  I had an in depth discussion with the patient regarding treatment options for arthritis and their relative risks and  "benefits. We reviewed surgical and nonsurgical option for treatment. Treatments include anti inflammatory medications, physical therapy, weight loss, activity modification, use of assistive devices, injection therapies. We discussed current prescriptions and risks and benefits of continuation of prescription medication as apporpriate. We discussed that arthritis is often progressive over time, an in end stage arthritis surgical interventions can be considered, including arthroplasty. All questions were answered and the patient voiced their understanding.  Will get him set up with physical therapy and anti-inflammatories and a guided cortisone injection I will see him back as needed    BMI Readings from Last 1 Encounters:   12/24/24 41.12 kg/m²      Lab Results   Component Value Date    CREATININE 0.86 10/24/2024     Tobacco Use: Low Risk  (1/3/2025)    Patient History     Smoking Tobacco Use: Never     Smokeless Tobacco Use: Never     Passive Exposure: Not on file      Computed MELD 3.0 unavailable. One or more values for this score either were not found within the given timeframe or did not fit some other criterion.  Computed MELD-Na unavailable. One or more values for this score either were not found within the given timeframe or did not fit some other criterion.       Lab Results   Component Value Date    HGBA1C 7.9 (H) 10/24/2024     No results found for: \"STAPHMRSASCR\"  "

## 2025-01-06 RX ORDER — DOCUSATE SODIUM 100 MG/1
100 CAPSULE, LIQUID FILLED ORAL NIGHTLY
Qty: 90 CAPSULE | Refills: 1 | Status: SHIPPED | OUTPATIENT
Start: 2025-01-06

## 2025-01-13 ENCOUNTER — HOSPITAL ENCOUNTER (OUTPATIENT)
Dept: CARDIOLOGY | Facility: HOSPITAL | Age: 55
Discharge: HOME | End: 2025-01-13
Payer: COMMERCIAL

## 2025-01-13 VITALS
DIASTOLIC BLOOD PRESSURE: 86 MMHG | OXYGEN SATURATION: 96 % | WEIGHT: 315 LBS | TEMPERATURE: 97.6 F | BODY MASS INDEX: 43.75 KG/M2 | HEART RATE: 73 BPM | SYSTOLIC BLOOD PRESSURE: 148 MMHG | RESPIRATION RATE: 16 BRPM

## 2025-01-13 DIAGNOSIS — M25.552 LEFT HIP PAIN: ICD-10-CM

## 2025-01-13 DIAGNOSIS — M16.12 PRIMARY OSTEOARTHRITIS OF LEFT HIP: ICD-10-CM

## 2025-01-13 PROCEDURE — 20610 DRAIN/INJ JOINT/BURSA W/O US: CPT | Performed by: RADIOLOGY

## 2025-01-13 PROCEDURE — 77002 NEEDLE LOCALIZATION BY XRAY: CPT

## 2025-01-13 PROCEDURE — 2550000001 HC RX 255 CONTRASTS: Performed by: RADIOLOGY

## 2025-01-13 PROCEDURE — 20610 DRAIN/INJ JOINT/BURSA W/O US: CPT | Mod: LT

## 2025-01-13 PROCEDURE — 77002 NEEDLE LOCALIZATION BY XRAY: CPT | Performed by: RADIOLOGY

## 2025-01-13 PROCEDURE — 2500000004 HC RX 250 GENERAL PHARMACY W/ HCPCS (ALT 636 FOR OP/ED): Performed by: RADIOLOGY

## 2025-01-13 RX ORDER — LIDOCAINE HYDROCHLORIDE 20 MG/ML
INJECTION, SOLUTION EPIDURAL; INFILTRATION; INTRACAUDAL; PERINEURAL
Status: COMPLETED | OUTPATIENT
Start: 2025-01-13 | End: 2025-01-13

## 2025-01-13 RX ORDER — IODIXANOL 270 MG/ML
5 INJECTION, SOLUTION INTRAVASCULAR
Status: COMPLETED | OUTPATIENT
Start: 2025-01-13 | End: 2025-01-13

## 2025-01-13 RX ADMIN — LIDOCAINE HYDROCHLORIDE 10 ML: 20 INJECTION, SOLUTION EPIDURAL; INFILTRATION; INTRACAUDAL; PERINEURAL at 11:05

## 2025-01-13 RX ADMIN — IODIXANOL 5 ML: 270 INJECTION, SOLUTION INTRAVASCULAR at 11:14

## 2025-01-13 ASSESSMENT — PAIN - FUNCTIONAL ASSESSMENT
PAIN_FUNCTIONAL_ASSESSMENT: 0-10
PAIN_FUNCTIONAL_ASSESSMENT: 0-10

## 2025-01-13 ASSESSMENT — PAIN SCALES - GENERAL: PAINLEVEL_OUTOF10: 10 - WORST POSSIBLE PAIN

## 2025-01-13 ASSESSMENT — PAIN DESCRIPTION - DESCRIPTORS: DESCRIPTORS: SHARP

## 2025-01-13 NOTE — POST-PROCEDURE NOTE
Interventional Radiology Brief Postprocedure Note    Attending: Karri Malone MD      Assistant: none    Diagnosis: hip pain    Description of procedure: hip injection     Anesthesia:  Local    Complications: None    Estimated Blood Loss: none    No specimens collected      See detailed result report with images in PACS.    The patient tolerated the procedure well without incident or complication.

## 2025-01-13 NOTE — Clinical Note
The site was marked. Prepped: left groin. Prepped with: ChloraPrep. The site was clipped. The patient was draped.

## 2025-01-22 ENCOUNTER — APPOINTMENT (OUTPATIENT)
Dept: CARDIOLOGY | Facility: CLINIC | Age: 55
End: 2025-01-22
Payer: COMMERCIAL

## 2025-01-27 ENCOUNTER — APPOINTMENT (OUTPATIENT)
Dept: PHYSICAL THERAPY | Facility: HOSPITAL | Age: 55
End: 2025-01-27
Payer: COMMERCIAL

## 2025-01-27 DIAGNOSIS — R29.898 HIP TIGHTNESS: Primary | ICD-10-CM

## 2025-01-27 DIAGNOSIS — M16.12 PRIMARY OSTEOARTHRITIS OF LEFT HIP: ICD-10-CM

## 2025-01-27 DIAGNOSIS — M25.552 LEFT HIP PAIN: ICD-10-CM

## 2025-01-27 PROCEDURE — 97110 THERAPEUTIC EXERCISES: CPT | Mod: GP | Performed by: PHYSICAL THERAPIST

## 2025-01-27 PROCEDURE — 97161 PT EVAL LOW COMPLEX 20 MIN: CPT | Mod: GP | Performed by: PHYSICAL THERAPIST

## 2025-01-27 ASSESSMENT — PATIENT HEALTH QUESTIONNAIRE - PHQ9
SUM OF ALL RESPONSES TO PHQ9 QUESTIONS 1 AND 2: 0
1. LITTLE INTEREST OR PLEASURE IN DOING THINGS: NOT AT ALL
2. FEELING DOWN, DEPRESSED OR HOPELESS: NOT AT ALL

## 2025-01-27 ASSESSMENT — ENCOUNTER SYMPTOMS
OCCASIONAL FEELINGS OF UNSTEADINESS: 0
LOSS OF SENSATION IN FEET: 0
DEPRESSION: 0

## 2025-01-27 ASSESSMENT — PAIN SCALES - GENERAL: PAINLEVEL_OUTOF10: 0 - NO PAIN

## 2025-01-27 ASSESSMENT — PAIN - FUNCTIONAL ASSESSMENT: PAIN_FUNCTIONAL_ASSESSMENT: 0-10

## 2025-01-27 NOTE — PROGRESS NOTES
Physical Therapy    Physical Therapy Evaluation    Patient Name: Axel Mohan  MRN: [id number]  Today's Date: 1/27/2025  Time Calculation  Start Time: 1640  Stop Time: 1730  Time Calculation (min): 50 min    PT Evaluation Time Entry  PT Evaluation (Low) Time Entry: 40  PT Therapeutic Procedures Time Entry  Therapeutic Exercise Time Entry: 10                   Visit # 1  Assessment  PT Assessment Results: Decreased range of motion, Decreased strength, Pain  Rehab Prognosis: Good  Evaluation/Treatment Tolerance: Patient tolerated treatment well  Pt. Is a 54 y/o male with dx L hip OA and L hip pain. Pt. Is with L hip pain, groin pain, pain with getting in / out of the car, walking long distances, bending over. Pt. Is with L>R hip ER posturing in sit and amb. Pt. Is with weakness L hip and knee, decreased AROM. Pt. Would benefit from skilled PT to address the above deficits.     Plan  Treatment/Interventions: Cryotherapy, Hot pack, Education/ Instruction, Self care/ home management, Therapeutic exercises, Therapeutic activities, Aquatic therapy, Manual therapy  PT Plan: Skilled PT  Rehab Potential: Good  Plan of Care Agreement: Patient   1x/ every other week aquatic PT to focus on HEP.     Current Problem  1. Hip tightness        2. Left hip pain  Referral to Physical Therapy    Follow Up In Physical Therapy      3. Primary osteoarthritis of left hip  Referral to Physical Therapy    Follow Up In Physical Therapy          Subjective   Pt. States he began with L hip pain in mid year 2024. Pt. Does not remember any specific incident causing his L hip pain, pt. Did after pain began fall onto a ladder coming out of a pool.    Pt. Did receive an injection in L hip, he feels it may be wearing off already.     Pt. With pain getting in / out of car lifting his leg. He does have some pain sitting for work on solid wood chair.     General:  General  Reason for Referral: intitial eval  Referred By: Dr. 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      Objective   Posture:    L>R ER       Range of Motion:  AROM : hips  DOMENICO wfl   Except IR / ER : limited by ~30%     Strength:  DOMENICO LE : wnl except as below  L knee 4+/5  L hip flex 4+/5         Flexibility:  HS: R : 68  L: 60    Calf: R 15;  L: 10     Palpation:     TTT L hip joint.   Special Tests:       Gait:.Pt. amb indep with decreased heel strike DOMENICO with L>R ER LE.         Other:  Visit 1: 1/27/25 Eval and HEP.     EXERCISES       Date       VISIT# # # # #    REPS REPS REPS REPS   aquatics              recheck                                                                                                                                                                                                    HEP                 Outcome Measures:  LEFS 53     OP EDUCATION:  Access Code: [email or web address]  URL: [email or web address]  Date: 01/27/2025  Prepared by: Echo Kemp    Exercises  - Supine Lower Trunk Rotation  - 1 x daily - 7 x weekly - 1-2 sets - 10 reps - 10sec hold  - Supine Hip External Rotation Stretch  - 1 x daily - 7 x weekly - 3 sets - 30sec hold  - Standing Hip Extension with Counter Support  - 1 x daily - 7 x weekly - 3 sets - 10 reps  Outpatient Education  Individual(s) Educated: Patient  Education Provided: Home Exercise Program, POC  Risk and Benefits Discussed with Patient/Caregiver/Other: yes  Patient/Caregiver Demonstrated Understanding: yes  Plan of Care Discussed and Agreed Upon: yes  Patient Response to Education: Patient/Caregiver Verbalized  Understanding of Information    Goals:  Active       L hip OA; L hip pain; hip tightness       Pt. will c/o less than 1/10 L hip and groin pain with increased walking through the grocery store.        Start:  01/27/25    Expected End:  03/27/25            Pt. Will be indep with progressive HEP to cont. Progress made in PT.        Start:  01/27/25    Expected End:  04/27/25            Pt. will increase L hip flexibility to wnl to allow increase ease with ADL's.        Start:  01/27/25    Expected End:  04/27/25

## 2025-01-29 ENCOUNTER — HOSPITAL ENCOUNTER (OUTPATIENT)
Dept: CARDIOLOGY | Facility: CLINIC | Age: 55
Discharge: HOME | End: 2025-01-29
Payer: COMMERCIAL

## 2025-01-29 DIAGNOSIS — I1A.0 RESISTANT HYPERTENSION: ICD-10-CM

## 2025-01-29 DIAGNOSIS — G47.33 SLEEP APNEA, OBSTRUCTIVE: ICD-10-CM

## 2025-01-29 DIAGNOSIS — I25.10 CORONARY ARTERY CALCIFICATION SEEN ON CT SCAN: ICD-10-CM

## 2025-01-29 DIAGNOSIS — I10 UNCONTROLLED HYPERTENSION: ICD-10-CM

## 2025-01-29 DIAGNOSIS — E78.2 MIXED HYPERLIPIDEMIA: ICD-10-CM

## 2025-01-29 PROCEDURE — 93306 TTE W/DOPPLER COMPLETE: CPT

## 2025-01-29 PROCEDURE — 93306 TTE W/DOPPLER COMPLETE: CPT | Performed by: INTERNAL MEDICINE

## 2025-01-30 LAB
AORTIC VALVE PEAK VELOCITY: 1.51 M/S
AV PEAK GRADIENT: 9 MMHG
AVA (PEAK VEL): 4.26 CM2
EJECTION FRACTION APICAL 4 CHAMBER: 64
EJECTION FRACTION: 60 %
LEFT ATRIUM VOLUME AREA LENGTH INDEX BSA: 25.1 ML/M2
LEFT VENTRICLE INTERNAL DIMENSION DIASTOLE: 4.89 CM (ref 3.5–6)
LEFT VENTRICULAR OUTFLOW TRACT DIAMETER: 2.56 CM
MITRAL VALVE E/A RATIO: 1.7
RIGHT VENTRICLE FREE WALL PEAK S': 13 CM/S
TRICUSPID ANNULAR PLANE SYSTOLIC EXCURSION: 2.3 CM

## 2025-02-02 DIAGNOSIS — I10 ESSENTIAL HYPERTENSION: ICD-10-CM

## 2025-02-03 RX ORDER — OLMESARTAN MEDOXOMIL 40 MG/1
40 TABLET ORAL DAILY
Qty: 30 TABLET | Refills: 1 | OUTPATIENT
Start: 2025-02-03

## 2025-02-04 DIAGNOSIS — M16.12 PRIMARY OSTEOARTHRITIS OF LEFT HIP: ICD-10-CM

## 2025-02-04 DIAGNOSIS — M25.552 LEFT HIP PAIN: ICD-10-CM

## 2025-02-04 RX ORDER — MELOXICAM 15 MG/1
15 TABLET ORAL DAILY PRN
Qty: 90 TABLET | Refills: 0 | Status: SHIPPED | OUTPATIENT
Start: 2025-02-04 | End: 2025-05-05

## 2025-02-05 ENCOUNTER — APPOINTMENT (OUTPATIENT)
Dept: CARDIOLOGY | Facility: CLINIC | Age: 55
End: 2025-02-05
Payer: COMMERCIAL

## 2025-02-09 DIAGNOSIS — I10 ESSENTIAL HYPERTENSION: ICD-10-CM

## 2025-02-10 DIAGNOSIS — I10 ESSENTIAL HYPERTENSION: ICD-10-CM

## 2025-02-10 DIAGNOSIS — F51.01 PRIMARY INSOMNIA: ICD-10-CM

## 2025-02-10 RX ORDER — TRAZODONE HYDROCHLORIDE 100 MG/1
100 TABLET ORAL NIGHTLY
Qty: 90 TABLET | Refills: 1 | OUTPATIENT
Start: 2025-02-10 | End: 2025-08-09

## 2025-02-10 RX ORDER — OLMESARTAN MEDOXOMIL 40 MG/1
40 TABLET ORAL DAILY
Qty: 30 TABLET | Refills: 1 | OUTPATIENT
Start: 2025-02-10

## 2025-02-11 RX ORDER — OLMESARTAN MEDOXOMIL 40 MG/1
40 TABLET ORAL DAILY
Qty: 90 TABLET | Refills: 0 | Status: SHIPPED | OUTPATIENT
Start: 2025-02-11 | End: 2025-05-12

## 2025-02-14 DIAGNOSIS — K59.03 DRUG-INDUCED CONSTIPATION: Primary | ICD-10-CM

## 2025-02-14 RX ORDER — POLYETHYLENE GLYCOL 3350 17 G/17G
17 POWDER, FOR SOLUTION ORAL DAILY
Qty: 510 G | Refills: 2 | Status: SHIPPED | OUTPATIENT
Start: 2025-02-14 | End: 2025-05-15

## 2025-02-25 ENCOUNTER — OFFICE VISIT (OUTPATIENT)
Dept: CARDIOLOGY | Facility: CLINIC | Age: 55
End: 2025-02-25
Payer: COMMERCIAL

## 2025-02-25 VITALS
DIASTOLIC BLOOD PRESSURE: 84 MMHG | HEIGHT: 75 IN | SYSTOLIC BLOOD PRESSURE: 144 MMHG | BODY MASS INDEX: 39.17 KG/M2 | WEIGHT: 315 LBS | TEMPERATURE: 97.4 F | HEART RATE: 72 BPM

## 2025-02-25 DIAGNOSIS — G47.33 SLEEP APNEA, OBSTRUCTIVE: ICD-10-CM

## 2025-02-25 DIAGNOSIS — E78.2 MIXED HYPERLIPIDEMIA: ICD-10-CM

## 2025-02-25 DIAGNOSIS — I1A.0 RESISTANT HYPERTENSION: ICD-10-CM

## 2025-02-25 DIAGNOSIS — I25.10 CORONARY ARTERY CALCIFICATION SEEN ON CT SCAN: ICD-10-CM

## 2025-02-25 DIAGNOSIS — I10 UNCONTROLLED HYPERTENSION: ICD-10-CM

## 2025-02-25 PROCEDURE — 3008F BODY MASS INDEX DOCD: CPT | Performed by: INTERNAL MEDICINE

## 2025-02-25 PROCEDURE — 99213 OFFICE O/P EST LOW 20 MIN: CPT | Performed by: INTERNAL MEDICINE

## 2025-02-25 PROCEDURE — 3079F DIAST BP 80-89 MM HG: CPT | Performed by: INTERNAL MEDICINE

## 2025-02-25 PROCEDURE — 4010F ACE/ARB THERAPY RXD/TAKEN: CPT | Performed by: INTERNAL MEDICINE

## 2025-02-25 PROCEDURE — 1036F TOBACCO NON-USER: CPT | Performed by: INTERNAL MEDICINE

## 2025-02-25 PROCEDURE — 3077F SYST BP >= 140 MM HG: CPT | Performed by: INTERNAL MEDICINE

## 2025-02-25 NOTE — PROGRESS NOTES
Chief Complaint:   Hypertension     History of Present Illness     Axel Mohan is a 55 y.o. male presenting with refractory hypertension in setting of obesity.  For 10-12 years he has been controlled on ACE alone and in the last year, BP resistant to multiple meds.  Just changed to ARB.  Compliant with CPAP.  Weight has not changed in last year.  Social EtOH.  No exercise.  Denies CP or dyspnea.  No cardiac Hx.  No Hx PAD.  Since 12/10/24 BP has been controlled. Exercises a little.       Previous History     Past Medical History:  He has a past medical history of Essential hypertension (05/06/2024), Hyponatremia (05/09/2024), Mixed hyperlipidemia (06/29/2022), Resistant hypertension (12/10/2024), and Type 2 diabetes mellitus with obesity (Multi) (05/06/2024).    Past Surgical History:  He has no past surgical history on file.      Social History:  He reports that he has never smoked. He has never used smokeless tobacco. He reports current alcohol use. He reports that he does not use drugs.    Family History:  Family History   Problem Relation Name Age of Onset    Atrial fibrillation Father          Allergies:  Patient has no known allergies.    Outpatient Medications:  Current Outpatient Medications   Medication Instructions    amLODIPine (NORVASC) 10 mg, oral, Daily    atorvastatin (LIPITOR) 20 mg, oral, Nightly    cholecalciferol (VITAMIN D-3) 50 mcg, oral, Daily    diatrizoate jacob-diatrizoat sod (Gastrografin) solution Take 15 ml (1 tablespoon) by mouth before bed, discard remaining medication.    glimepiride (AMARYL) 4 mg, oral, Daily with breakfast    hydroCHLOROthiazide (HYDRODIURIL) 25 mg, oral, Every morning    meloxicam (MOBIC) 15 mg, oral, Daily PRN    metFORMIN XR (GLUCOPHAGE-XR) 1,000 mg, oral, 2 times daily    Mounjaro 5 mg/0.5 mL pen injector Inject 5 mg under the skin 1 (one) time per week for 20 doses. Do not fill before November 25, 2024.    nebivolol (BYSTOLIC) 5 mg, oral, Daily    olmesartan  "(BENICAR) 40 mg, oral, Daily    polyethylene glycol (MIRALAX) 17 g, oral, Daily    traZODone (DESYREL) 100 mg, oral, Nightly       Physical Examination   Vitals:  Visit Vitals  /84 (BP Location: Right arm, Patient Position: Sitting, BP Cuff Size: Adult long)   Pulse 72   Temp 36.3 °C (97.4 °F) (Temporal)   Ht 1.905 m (6' 3\")   Wt (!) 153 kg (338 lb 6.4 oz)   BMI 42.30 kg/m²   Smoking Status Never   BSA 2.85 m²    Physical Exam  Vitals reviewed.   Constitutional:       General: He is not in acute distress.     Appearance: Normal appearance.   HENT:      Head: Normocephalic and atraumatic.      Nose: Nose normal.   Eyes:      Conjunctiva/sclera: Conjunctivae normal.   Cardiovascular:      Rate and Rhythm: Normal rate and regular rhythm.      Pulses: Normal pulses.      Heart sounds: No murmur heard.  Pulmonary:      Effort: Pulmonary effort is normal. No respiratory distress.      Breath sounds: Normal breath sounds. No wheezing, rhonchi or rales.   Abdominal:      General: Bowel sounds are normal. There is no distension.      Palpations: Abdomen is soft.      Tenderness: There is no abdominal tenderness.   Musculoskeletal:         General: No swelling.      Right lower leg: No edema.      Left lower leg: No edema.   Skin:     General: Skin is warm and dry.      Capillary Refill: Capillary refill takes less than 2 seconds.   Neurological:      General: No focal deficit present.      Mental Status: He is alert.   Psychiatric:         Mood and Affect: Mood normal.             Labs/Imaging/Cardiac Studies     Last Labs:  CBC -  Lab Results   Component Value Date    WBC 6.7 10/24/2024    HGB 14.5 10/24/2024    HCT 43.2 10/24/2024    MCV 94 10/24/2024     10/24/2024       CMP -  Lab Results   Component Value Date    CALCIUM 9.3 10/24/2024    PROT 7.4 10/24/2024    ALBUMIN 4.4 10/24/2024    AST 30 10/24/2024    ALT 43 10/24/2024    ALKPHOS 56 10/24/2024    BILITOT 0.5 10/24/2024       LIPID PANEL -   Lab " Results   Component Value Date    CHOL 142 10/24/2024    HDL 72.9 10/24/2024    CHHDL 1.9 10/24/2024    VLDL 17 10/24/2024    TRIG 86 10/24/2024    NHDL 69 10/24/2024       RENAL FUNCTION PANEL -   Lab Results   Component Value Date    K 4.3 10/24/2024       Lab Results   Component Value Date    HGBA1C 7.9 (H) 10/24/2024         Reviewed Echo  Echo:  Transthoracic echo (TTE) complete    Result Date: 1/30/2025        [facility] [place], [place] [address]        Tel [phone] and Fax [phone] TRANSTHORACIC ECHOCARDIOGRAM REPORT  Patient Name:       DAVID Gilbert Physician:    [id number] Nguyen MD Study Date:         1/29/2025           Ordering Provider:    [id number] NGUYEN MRN/PID:            [id number]            Fellow: Accession#:         [id number]        Nurse: Date of Birth/Age:  1970 / 55      Sonographer:          Anne Marie shah                                     SVETLANA Gender assigned at  M                   Additional Staff: Birth: Height:             190.50 cm           Admit Date:           1/29/2025 Weight:             163.29 kg           Admission Status:     Outpatient BSA / BMI:          2.82 m2 / 45.00     Encounter#:           [id number]                     kg/m2 Blood Pressure:     /                   Department Location:  [facility] Study Type:    TRANSTHORACIC ECHO (TTE) COMPLETE Diagnosis/ICD: Essential (primary) hypertension-I10 Indication:    HTN CPT Code:      Echo Complete w Full Doppler-[id number]  Study Detail: The following Echo studies were performed: 2D, M-Mode, Doppler and               color flow. Technically challenging study due to patient lying in               supine position. Patient's heart rhythm is  normal sinus rhythm. A               bubble study was not performed.  PHYSICIAN INTERPRETATION: Left Ventricle: The left ventricular systolic function is normal, with a Mendoza's biplane calculated ejection fraction of 60%. There are no regional left ventricular wall motion abnormalities. The left ventricular cavity size is normal. There is mildly increased septal and normal posterior left ventricular wall thickness. There is left ventricular concentric remodeling. Spectral Doppler shows a normal pattern of left ventricular diastolic filling. Left Atrium: The left atrial size is normal. A bubble study using agitated saline was not performed. Right Ventricle: The right ventricle is normal in size. There is normal right ventricular global systolic function. Right Atrium: The right atrium is normal in size. Aortic Valve: The aortic valve is trileaflet. There is no evidence of aortic valve regurgitation. The peak instantaneous gradient of the aortic valve is 9 mmHg. Mitral Valve: The mitral valve is normal in structure. There is no evidence of mitral valve regurgitation. Tricuspid Valve: The tricuspid valve is structurally normal. There is trace to mild tricuspid regurgitation. Pulmonic Valve: The pulmonic valve is structurally normal. There is physiologic pulmonic valve regurgitation. Pericardium: There is no pericardial effusion noted. Aorta: The aortic root is normal. In comparison to the previous echocardiogram(s): There are no prior studies on this patient for comparison purposes.  CONCLUSIONS:  1. The left ventricular systolic function is normal, with a Mendoza's biplane calculated ejection fraction of 60%. QUANTITATIVE DATA SUMMARY:  2D MEASUREMENTS:          Normal Ranges: LAs:             3.86 cm  (2.7-4.0cm) RVIDd:           2.87 cm  (0.9-3.6cm) IVSd:            1.08 cm  (0.6-1.1cm) LVPWd:           1.03 cm  (0.6-1.1cm) LVIDd:           4.89 cm  (3.9-5.9cm) LVIDs:           3.01 cm LV Mass Index:   67 g/m2  LVEDV Index:     37 ml/m2 LV % FS          38.4 %  LEFT ATRIUM:                  Normal Ranges: LA Vol A4C:        75.9 ml    (22+/-6mL/m2) LA Vol A2C:        65.0 ml LA Vol BP:         70.8 ml LA Vol Index A4C:  26.9 ml/m2 LA Vol Index A2C:  23.1 ml/m2 LA Vol Index BP:   25.1 ml/m2 LA Area A4C:       23.9 cm2 LA Area A2C:       22.3 cm2 LA Major Axis A4C: 6.4 cm LA Major Axis A2C: 6.5 cm LA Vol A4C:        73.2 ml LA Vol A2C:        63.3 ml LA Vol Index BSA:  24.2 ml/m2  RIGHT ATRIUM:                 Normal Ranges: RA Vol A4C:        44.6 ml    (8.3-19.5ml) RA Vol Index A4C:  15.8 ml/m2 RA Area A4C:       17.6 cm2 RA Major Axis A4C: 5.9 cm  LV SYSTOLIC FUNCTION:                      Normal Ranges: EF-A4C View:    64 % (>=55%) EF-A2C View:    59 % EF-Biplane:     60 % LV EF Reported: 60 %  LV DIASTOLIC FUNCTION:            Normal Ranges: MV Peak E:             1.06 m/s   (0.7-1.2 m/s) MV Peak A:             0.63 m/s   (0.42-0.7 m/s) E/A Ratio:             1.70       (1.0-2.2) MV e'                  0.124 m/s  (>8.0) MV lateral e'          0.13 m/s MV medial e'           0.11 m/s MV A Dur:              91.34 msec E/e' Ratio:            8.55       (<8.0)  MITRAL VALVE:          Normal Ranges: MV DT:        196 msec (150-240msec)  AORTIC VALVE:           Normal Ranges: AoV Vmax:      1.51 m/s (<=1.7m/s) AoV Peak P.1 mmHg (<20mmHg) LVOT Max Marcelo:  1.25 m/s (<=1.1m/s) LVOT VTI:      25.36 cm LVOT Diameter: 2.56 cm  (1.8-2.4cm) AoV Area,Vmax: 4.26 cm2 (2.5-4.5cm2)  RIGHT VENTRICLE: RV Basal 3.50 cm RV Major 8.1 cm TAPSE:   23.4 mm RV s'    0.13 m/s  TRICUSPID VALVE/RVSP:        Normal Ranges: Est. RA Pressure:     3 mmHg  PULMONIC VALVE:          Normal Ranges: PV Accel Time:  148 msec (>120ms) PV Max Marcelo:     0.9 m/s  (0.6-0.9m/s) PV Max PG:      3.3 mmHg  AORTA: Asc Ao Diam 3.73 cm  [id number] Brett Ariza MD Electronically signed on 2025 at 8:43:37 AM  ** Final **         Assessment and Recommendations      Assessment/Plan   1. Resistant hypertension (Primary)  The patient's blood pressure has been well-controlled at today's appointment or by recent OP visits/home measurements and meets their goal blood pressure per the ACC/AHA guidelines.  The patient has been compliant with their anti-hypertensive medications and is following a low sodium/DASH diet. I advised continuation of their present medical treatment and lifestyle modification. Minimize Meloxicam.       2. Mixed hyperlipidemia  The patient's lipids are well controlled on chronic atorvastatin therapy and they are meeting their goal LDL cholesterol per the ACC/AHA guidelines.      3. Coronary artery calcification seen on CT scan  Low CAC.  No angina.  LDL excellent.    4. Sleep apnea, obstructive  Compliant with CPAP.     Exercise and weight loss  See me prhelio Ariza MD    Exclusive of any other services or procedures performed, I, Brett Ariza MD , spent 30 minutes in duration for this visit today.  This time consisted of chart review, obtaining history, and/or performing the exam as documented above as well as documenting the clinical information for the encounter in the electronic record, discussing treatment options, plans, and/or goals with patient, family, and/or caregiver, refilling medications, updating the electronic record, ordering medicines, lab work, imaging, referrals, and/or procedures as documented above and communicating with other [facility] professionals. I have discussed the results of laboratory, radiology, and cardiology studies with the patient and their family/caregiver.

## 2025-04-28 DIAGNOSIS — F51.01 PRIMARY INSOMNIA: ICD-10-CM

## 2025-04-29 RX ORDER — TRAZODONE HYDROCHLORIDE 100 MG/1
100 TABLET ORAL NIGHTLY
Qty: 90 TABLET | Refills: 1 | OUTPATIENT
Start: 2025-04-29 | End: 2025-10-26

## 2025-05-01 ENCOUNTER — OFFICE VISIT (OUTPATIENT)
Dept: PRIMARY CARE | Facility: CLINIC | Age: 55
End: 2025-05-01
Payer: COMMERCIAL

## 2025-05-01 VITALS
BODY MASS INDEX: 36.56 KG/M2 | WEIGHT: 294 LBS | SYSTOLIC BLOOD PRESSURE: 136 MMHG | DIASTOLIC BLOOD PRESSURE: 80 MMHG | HEART RATE: 72 BPM | HEIGHT: 75 IN

## 2025-05-01 DIAGNOSIS — E66.9 TYPE 2 DIABETES MELLITUS WITH OBESITY (MULTI): Primary | ICD-10-CM

## 2025-05-01 DIAGNOSIS — I10 ESSENTIAL HYPERTENSION: ICD-10-CM

## 2025-05-01 DIAGNOSIS — E11.69 TYPE 2 DIABETES MELLITUS WITH OBESITY (MULTI): Primary | ICD-10-CM

## 2025-05-01 DIAGNOSIS — F51.01 PRIMARY INSOMNIA: ICD-10-CM

## 2025-05-01 DIAGNOSIS — E78.2 MIXED HYPERLIPIDEMIA: ICD-10-CM

## 2025-05-01 DIAGNOSIS — R79.89 TSH ELEVATION: ICD-10-CM

## 2025-05-01 PROCEDURE — 99214 OFFICE O/P EST MOD 30 MIN: CPT | Performed by: FAMILY MEDICINE

## 2025-05-01 PROCEDURE — 3008F BODY MASS INDEX DOCD: CPT | Performed by: FAMILY MEDICINE

## 2025-05-01 PROCEDURE — 3079F DIAST BP 80-89 MM HG: CPT | Performed by: FAMILY MEDICINE

## 2025-05-01 PROCEDURE — 3075F SYST BP GE 130 - 139MM HG: CPT | Performed by: FAMILY MEDICINE

## 2025-05-01 PROCEDURE — 4010F ACE/ARB THERAPY RXD/TAKEN: CPT | Performed by: FAMILY MEDICINE

## 2025-05-01 PROCEDURE — 1036F TOBACCO NON-USER: CPT | Performed by: FAMILY MEDICINE

## 2025-05-01 RX ORDER — NEBIVOLOL 5 MG/1
5 TABLET ORAL DAILY
Qty: 90 TABLET | Refills: 1 | Status: SHIPPED | OUTPATIENT
Start: 2025-05-01 | End: 2025-10-28

## 2025-05-01 RX ORDER — HYDROCHLOROTHIAZIDE 25 MG/1
25 TABLET ORAL EVERY MORNING
Qty: 90 TABLET | Refills: 1 | Status: SHIPPED | OUTPATIENT
Start: 2025-05-01 | End: 2025-10-28

## 2025-05-01 RX ORDER — TRAZODONE HYDROCHLORIDE 100 MG/1
100 TABLET ORAL NIGHTLY
Qty: 90 TABLET | Refills: 1 | Status: SHIPPED | OUTPATIENT
Start: 2025-05-01 | End: 2025-10-28

## 2025-05-01 RX ORDER — AMLODIPINE BESYLATE 10 MG/1
10 TABLET ORAL DAILY
Qty: 90 TABLET | Refills: 1 | Status: SHIPPED | OUTPATIENT
Start: 2025-05-01 | End: 2025-10-28

## 2025-05-01 RX ORDER — OLMESARTAN MEDOXOMIL 40 MG/1
40 TABLET ORAL DAILY
Qty: 90 TABLET | Refills: 1 | Status: SHIPPED | OUTPATIENT
Start: 2025-05-01 | End: 2025-10-28

## 2025-05-01 RX ORDER — ATORVASTATIN CALCIUM 20 MG/1
20 TABLET, FILM COATED ORAL NIGHTLY
Qty: 90 TABLET | Refills: 1 | Status: SHIPPED | OUTPATIENT
Start: 2025-05-01 | End: 2025-10-28

## 2025-05-01 NOTE — PROGRESS NOTES
Chief Complaint  Patient ID: Axel Mohan is a 55 y.o. male who presents for Hypertension, Hyperlipidemia, Insomnia, and Diabetes.    Past Medical, Surgical, and Family History reviewed and updated in chart.    Reviewed all medications by prescribing practitioner or clinical pharmacist (such as prescriptions, OTCs, herbal therapies and supplements) and documented in the medical record.    History of Present Illness  1. Type II Diabetes Mellitus     - Last hemoglobin A1c was 7.9% six months ago.     - Condition managed with Mounjaro, metformin 2,000 mg daily, and Amaryl 4 mg.     - Axel took only one dose of Mounjaro due to constipation, resolved with Miralax.     - Plans to restart Mounjaro after this visit.    2. Hypertension     - Blood pressure today on intake was normotensive.     - Current antihypertensive regimen includes Benicar 40 mg and hydrochlorothiazide 25 mg.    3. Insomnia     - Symptoms well controlled on trazodone 100 mg.     - Patient is requesting a refill.    4. Hyperlipidemia     - Last lipid panel showed LDL of 52 on Lipitor 20 mg.     - Patient is tolerating the medication well.    Review of Systems  All pertinent positive symptoms are included in the history of present illness.    All other systems have been reviewed and are negative and noncontributory to this patient's current ailments.    Past Medical History  He has a past medical history of Essential hypertension (05/06/2024), Hyponatremia (05/09/2024), Mixed hyperlipidemia (06/29/2022), Resistant hypertension (12/10/2024), and Type 2 diabetes mellitus with obesity (Multi) (05/06/2024).    Surgical History  He has no past surgical history on file.     Social History  He reports that he has never smoked. He has never used smokeless tobacco. He reports current alcohol use. He reports that he does not use drugs.    Family History[1]  Medications Prior to Visit[2]    Allergies  Patient has no known allergies.    Immunization History  "  Administered Date(s) Administered    Pneumococcal conjugate vaccine, 20-valent (PREVNAR 20) 06/29/2022    Pneumococcal polysaccharide vaccine, 23-valent, age 2 years and older (PNEUMOVAX 23) 01/08/2021    Td vaccine, age 7 years and older (TENIVAC) 04/20/2017    Zoster vaccine, recombinant, adult (SHINGRIX) 05/06/2024, 10/24/2024     Objective   Visit Vitals  /80   Pulse 72   Ht 1.905 m (6' 3\")   Wt 133 kg (294 lb)   BMI 36.75 kg/m²   Smoking Status Never   BSA 2.65 m²        BP Readings from Last 3 Encounters:   05/01/25 136/80   02/25/25 144/84   01/13/25 148/86      Wt Readings from Last 3 Encounters:   05/01/25 133 kg (294 lb)   02/25/25 (!) 153 kg (338 lb 6.4 oz)   01/13/25 (!) 159 kg (350 lb)      Relevant Results  No visits with results within 1 Month(s) from this visit.   Latest known visit with results is:   Hospital Outpatient Visit on 01/29/2025   Component Date Value    AV pk gabbi 01/29/2025 1.51     LVOT diam 01/29/2025 2.56     MV E/A ratio 01/29/2025 1.70     LA vol index A/L 01/29/2025 25.1     Tricuspid annular plane * 01/29/2025 2.3     LV EF 01/29/2025 60     RV free wall pk S' 01/29/2025 13.00     LVIDd 01/29/2025 4.89     Aortic Valve Area by Con* 01/29/2025 4.26     AV pk grad 01/29/2025 9     LV A4C EF 01/29/2025 64.0      The 10-year ASCVD risk score (Matt DK, et al., 2019) is: 7.5%    Values used to calculate the score:      Age: 55 years      Sex: Male      Is Non- : No      Diabetic: Yes      Tobacco smoker: No      Systolic Blood Pressure: 136 mmHg      Is BP treated: Yes      HDL Cholesterol: 72.9 mg/dL      Total Cholesterol: 142 mg/dL    Physical Exam  CONSTITUTIONAL - well nourished, well developed, looks like stated age, in no acute distress, not ill-appearing, and not tired appearing  SKIN - normal skin color and pigmentation, normal skin turgor without rash, lesions, or nodules visualized  HEAD - no trauma, normocephalic  EYES - pupils are equal and " reactive to light, extraocular muscles are intact, and normal external exam  CHEST - clear to auscultation, no wheezing, no crackles and no rales, good effort  CARDIAC - regular rate and regular rhythm, no skipped beats, no murmur  EXTREMITIES - no obvious or evident edema, no obvious or evident deformities  NEUROLOGICAL - alert, oriented and no focal signs  PSYCHIATRIC - alert, pleasant and cordial, age-appropriate    Assessment and Plan  Problem List Items Addressed This Visit       Essential hypertension    Your blood pressure is at goal!     I would like to have you monitor and record blood pressures at home   Blood pressure goal should be below 130/80, ideally 120/80  If the blood pressure is too high or too low, we need to consider making adjustments to your antihypertensive therapy          Relevant Medications    amLODIPine (Norvasc) 10 mg tablet    hydroCHLOROthiazide (HYDRODiuril) 25 mg tablet    nebivolol (Bystolic) 5 mg tablet    olmesartan (BENIcar) 40 mg tablet    Other Relevant Orders    Comprehensive metabolic panel    Mixed hyperlipidemia    We will repeat your labs at this time and notify you once the results are available.           Relevant Medications    atorvastatin (Lipitor) 20 mg tablet    Other Relevant Orders    Lipid panel    Type 2 diabetes mellitus with obesity (Multi) - Primary    We will repeat your labs at this time and notify you if a change in your regimen is indicated.          Relevant Medications    atorvastatin (Lipitor) 20 mg tablet    Other Relevant Orders    Hemoglobin A1c    Comprehensive metabolic panel    Primary insomnia    Condition stable. No changes to medication.  A refill of your medication has been sent to the pharmacy.           Relevant Medications    traZODone (Desyrel) 100 mg tablet    TSH elevation    We will repeat your labs at this time and notify you once the results are available.           Relevant Orders    TSH with reflex to Free T4 if abnormal           [1]   Family History  Problem Relation Name Age of Onset    Atrial fibrillation Father     [2]   Outpatient Medications Prior to Visit   Medication Sig Dispense Refill    cholecalciferol (Vitamin D-3) 50 mcg (2,000 unit) capsule Take 1 capsule (50 mcg) by mouth once daily. 90 capsule 1    diatrizoate jacob-diatrizoat sod (Gastrografin) solution Take 15 ml (1 tablespoon) by mouth before bed, discard remaining medication. 30 mL 0    glimepiride (Amaryl) 4 mg tablet Take 1 tablet (4 mg) by mouth once daily with breakfast. 90 tablet 1    meloxicam (Mobic) 15 mg tablet Take 1 tablet (15 mg) by mouth once daily as needed for moderate pain (4 - 6). 90 tablet 0    metFORMIN XR (Glucophage-XR) 500 mg 24 hr tablet Take 2 tablets (1,000 mg) by mouth 2 times a day. 360 tablet 1    polyethylene glycol (Miralax) 17 gram/dose powder Mix 17 g of powder and drink once daily. 510 g 2    amLODIPine (Norvasc) 10 mg tablet Take 1 tablet (10 mg) by mouth once daily. 90 tablet 1    atorvastatin (Lipitor) 20 mg tablet Take 1 tablet (20 mg) by mouth once daily at bedtime. 90 tablet 1    hydroCHLOROthiazide (HYDRODiuril) 25 mg tablet Take 1 tablet (25 mg) by mouth once daily in the morning. 90 tablet 1    nebivolol (Bystolic) 5 mg tablet Take 1 tablet (5 mg) by mouth once daily. 90 tablet 1    olmesartan (BENIcar) 40 mg tablet Take 1 tablet (40 mg) by mouth once daily. 90 tablet 0    traZODone (Desyrel) 100 mg tablet Take 1 tablet (100 mg) by mouth once daily at bedtime. 90 tablet 1     No facility-administered medications prior to visit.

## 2025-05-01 NOTE — ASSESSMENT & PLAN NOTE
Your blood pressure is at goal!     I would like to have you monitor and record blood pressures at home   Blood pressure goal should be below 130/80, ideally 120/80  If the blood pressure is too high or too low, we need to consider making adjustments to your antihypertensive therapy

## 2025-05-05 DIAGNOSIS — M16.12 PRIMARY OSTEOARTHRITIS OF LEFT HIP: ICD-10-CM

## 2025-05-05 DIAGNOSIS — M25.552 LEFT HIP PAIN: ICD-10-CM

## 2025-05-05 RX ORDER — MELOXICAM 15 MG/1
TABLET ORAL
Qty: 90 TABLET | Refills: 0 | Status: SHIPPED | OUTPATIENT
Start: 2025-05-05

## 2025-05-06 DIAGNOSIS — E55.9 VITAMIN D DEFICIENCY: ICD-10-CM

## 2025-05-06 RX ORDER — ACETAMINOPHEN 500 MG
TABLET ORAL DAILY
Qty: 90 CAPSULE | Refills: 1 | Status: SHIPPED | OUTPATIENT
Start: 2025-05-06

## 2025-05-23 LAB
ALBUMIN SERPL-MCNC: 4.4 G/DL (ref 3.6–5.1)
ALP SERPL-CCNC: 48 U/L (ref 35–144)
ALT SERPL-CCNC: 28 U/L (ref 9–46)
ANION GAP SERPL CALCULATED.4IONS-SCNC: 10 MMOL/L (CALC) (ref 7–17)
AST SERPL-CCNC: 20 U/L (ref 10–35)
BILIRUB SERPL-MCNC: 0.5 MG/DL (ref 0.2–1.2)
BUN SERPL-MCNC: 26 MG/DL (ref 7–25)
CALCIUM SERPL-MCNC: 9.3 MG/DL (ref 8.6–10.3)
CHLORIDE SERPL-SCNC: 101 MMOL/L (ref 98–110)
CHOLEST SERPL-MCNC: 135 MG/DL
CHOLEST/HDLC SERPL: 2 (CALC)
CO2 SERPL-SCNC: 24 MMOL/L (ref 20–32)
CREAT SERPL-MCNC: 1.07 MG/DL (ref 0.7–1.3)
EGFRCR SERPLBLD CKD-EPI 2021: 81 ML/MIN/1.73M2
EST. AVERAGE GLUCOSE BLD GHB EST-MCNC: 151 MG/DL
EST. AVERAGE GLUCOSE BLD GHB EST-SCNC: 8.4 MMOL/L
GLUCOSE SERPL-MCNC: 167 MG/DL (ref 65–99)
HBA1C MFR BLD: 6.9 %
HDLC SERPL-MCNC: 66 MG/DL
LDLC SERPL CALC-MCNC: 51 MG/DL (CALC)
NONHDLC SERPL-MCNC: 69 MG/DL (CALC)
POTASSIUM SERPL-SCNC: 4.4 MMOL/L (ref 3.5–5.3)
PROT SERPL-MCNC: 6.7 G/DL (ref 6.1–8.1)
SODIUM SERPL-SCNC: 135 MMOL/L (ref 135–146)
TRIGL SERPL-MCNC: 99 MG/DL
TSH SERPL-ACNC: 4.37 MIU/L (ref 0.4–4.5)

## 2025-05-26 DIAGNOSIS — E11.69 TYPE 2 DIABETES MELLITUS WITH OBESITY (MULTI): ICD-10-CM

## 2025-05-26 DIAGNOSIS — E66.9 TYPE 2 DIABETES MELLITUS WITH OBESITY (MULTI): ICD-10-CM

## 2025-05-26 RX ORDER — METFORMIN HYDROCHLORIDE 500 MG/1
1000 TABLET, EXTENDED RELEASE ORAL 2 TIMES DAILY
Qty: 360 TABLET | Refills: 1 | Status: SHIPPED | OUTPATIENT
Start: 2025-05-26 | End: 2025-11-22

## 2025-05-26 RX ORDER — GLIMEPIRIDE 4 MG/1
4 TABLET ORAL
Qty: 90 TABLET | Refills: 1 | Status: SHIPPED | OUTPATIENT
Start: 2025-05-26 | End: 2025-11-22

## 2025-06-11 ENCOUNTER — OFFICE VISIT (OUTPATIENT)
Dept: PRIMARY CARE | Facility: CLINIC | Age: 55
End: 2025-06-11
Payer: COMMERCIAL

## 2025-06-11 VITALS
HEART RATE: 68 BPM | DIASTOLIC BLOOD PRESSURE: 72 MMHG | WEIGHT: 301 LBS | BODY MASS INDEX: 37.42 KG/M2 | HEIGHT: 75 IN | SYSTOLIC BLOOD PRESSURE: 122 MMHG

## 2025-06-11 DIAGNOSIS — L30.9 DERMATITIS: Primary | ICD-10-CM

## 2025-06-11 DIAGNOSIS — L29.9 PRURITUS: ICD-10-CM

## 2025-06-11 PROCEDURE — 1036F TOBACCO NON-USER: CPT | Performed by: STUDENT IN AN ORGANIZED HEALTH CARE EDUCATION/TRAINING PROGRAM

## 2025-06-11 PROCEDURE — 4010F ACE/ARB THERAPY RXD/TAKEN: CPT | Performed by: STUDENT IN AN ORGANIZED HEALTH CARE EDUCATION/TRAINING PROGRAM

## 2025-06-11 PROCEDURE — 3078F DIAST BP <80 MM HG: CPT | Performed by: STUDENT IN AN ORGANIZED HEALTH CARE EDUCATION/TRAINING PROGRAM

## 2025-06-11 PROCEDURE — 3074F SYST BP LT 130 MM HG: CPT | Performed by: STUDENT IN AN ORGANIZED HEALTH CARE EDUCATION/TRAINING PROGRAM

## 2025-06-11 PROCEDURE — 99214 OFFICE O/P EST MOD 30 MIN: CPT | Performed by: STUDENT IN AN ORGANIZED HEALTH CARE EDUCATION/TRAINING PROGRAM

## 2025-06-11 PROCEDURE — 3008F BODY MASS INDEX DOCD: CPT | Performed by: STUDENT IN AN ORGANIZED HEALTH CARE EDUCATION/TRAINING PROGRAM

## 2025-06-11 RX ORDER — DIPHENHYDRAMINE HCL 25 MG
25 TABLET ORAL NIGHTLY PRN
Qty: 30 TABLET | Refills: 0 | Status: SHIPPED | OUTPATIENT
Start: 2025-06-11 | End: 2025-07-11

## 2025-06-11 RX ORDER — PREDNISONE 10 MG/1
TABLET ORAL
Qty: 20 TABLET | Refills: 0 | Status: SHIPPED | OUTPATIENT
Start: 2025-06-11

## 2025-06-11 ASSESSMENT — ENCOUNTER SYMPTOMS
SORE THROAT: 0
DIARRHEA: 0
SHORTNESS OF BREATH: 0
NAIL CHANGES: 0
FEVER: 0
ANOREXIA: 0
COUGH: 0
VOMITING: 0
RHINORRHEA: 0
FATIGUE: 0
EYE PAIN: 0

## 2025-06-11 ASSESSMENT — PATIENT HEALTH QUESTIONNAIRE - PHQ9
1. LITTLE INTEREST OR PLEASURE IN DOING THINGS: NOT AT ALL
2. FEELING DOWN, DEPRESSED OR HOPELESS: NOT AT ALL
SUM OF ALL RESPONSES TO PHQ9 QUESTIONS 1 AND 2: 0

## 2025-06-11 NOTE — PROGRESS NOTES
"Subjective   Patient ID: Axel Mohan is a 55 y.o. male who presents for Rash.    Rash  This is a new problem. The current episode started in the past 7 days. The problem has been gradually worsening since onset. The affected locations include the back. The rash is characterized by burning, pain, swelling and itchiness. He was exposed to nothing. Pertinent negatives include no anorexia, congestion, cough, diarrhea, eye pain, facial edema, fatigue, fever, joint pain, nail changes, rhinorrhea, shortness of breath, sore throat or vomiting.        Review of Systems   Constitutional:  Negative for fatigue and fever.   HENT:  Negative for congestion, rhinorrhea and sore throat.    Eyes:  Negative for pain.   Respiratory:  Negative for cough and shortness of breath.    Gastrointestinal:  Negative for anorexia, diarrhea and vomiting.   Musculoskeletal:  Negative for joint pain.   Skin:  Positive for rash. Negative for nail changes.       Objective   /72   Pulse 68   Ht 1.905 m (6' 3\")   Wt 137 kg (301 lb)   BMI 37.62 kg/m²     Physical Exam  Constitutional:       General: He is not in acute distress.     Appearance: Normal appearance. He is obese. He is not ill-appearing.   HENT:      Head: Normocephalic and atraumatic.      Nose: Nose normal. No congestion or rhinorrhea.      Mouth/Throat:      Mouth: Mucous membranes are moist.   Eyes:      Extraocular Movements: Extraocular movements intact.      Conjunctiva/sclera: Conjunctivae normal.      Pupils: Pupils are equal, round, and reactive to light.   Pulmonary:      Effort: Pulmonary effort is normal.      Breath sounds: Normal breath sounds.   Musculoskeletal:      Cervical back: Normal range of motion and neck supple.   Skin:     General: Skin is warm.      Capillary Refill: Capillary refill takes less than 2 seconds.      Findings: Rash present. No bruising or lesion.      Comments: Maculopapular rash spread in his back, with signs of scratching and crusts "   Neurological:      General: No focal deficit present.      Mental Status: He is alert. Mental status is at baseline.   Psychiatric:         Mood and Affect: Mood normal.         Behavior: Behavior normal.         Assessment/Plan   Diagnoses and all orders for this visit:  Dermatitis  -     diphenhydrAMINE (Sominex) 25 mg tablet; Take 1 tablet (25 mg) by mouth as needed at bedtime for itching.  -     predniSONE (Deltasone) 10 mg tablet; TAKE 4 TABLETS DAILY FOR 2 DAYS, 3 TABLETS DAILY FOR 2 DAYS, 2 TABLETS DAILY FOR 2 DAYS AND 1 TABLET DAILY FOR 2 DAYS, THEN STOP  Pruritus  -     diphenhydrAMINE (Sominex) 25 mg tablet; Take 1 tablet (25 mg) by mouth as needed at bedtime for itching.    Clinical presentation and physical exam are more consistent with contact dermatitis, likely from exposure to irritants.  Poison ivy and shingles were considered but they are low differential diagnosis at this moment.  Will treat with prednisone 8-day taper and diphenhydramine for itching.  Educated about the risks of increasing blood glucose secondary to use of steroids  Patient instructed to use over-the-counter low potency hydrocortisone.  Instructed to monitor for any worsening symptoms and contact our office or dermatology if needed.      Thank you for letting us be a part of your care team.  Please call the office if you have further questions or concerns regarding your care    Sagrario Chavez MD  [id number],  Resident  06/11/25

## 2025-08-21 DIAGNOSIS — M16.12 PRIMARY OSTEOARTHRITIS OF LEFT HIP: ICD-10-CM

## 2025-08-21 DIAGNOSIS — M25.552 LEFT HIP PAIN: ICD-10-CM

## 2025-08-25 RX ORDER — MELOXICAM 15 MG/1
15 TABLET ORAL DAILY
Qty: 30 TABLET | Refills: 0 | Status: SHIPPED | OUTPATIENT
Start: 2025-08-25 | End: 2025-09-24